# Patient Record
Sex: FEMALE | Race: WHITE | NOT HISPANIC OR LATINO | Employment: OTHER | URBAN - METROPOLITAN AREA
[De-identification: names, ages, dates, MRNs, and addresses within clinical notes are randomized per-mention and may not be internally consistent; named-entity substitution may affect disease eponyms.]

---

## 2023-02-21 LAB
BASOPHILS (10*3/UL) IN BLOOD BY AUTOMATED COUNT: 0.06 X10E9/L (ref 0–0.1)
BASOPHILS/100 LEUKOCYTES IN BLOOD BY AUTOMATED COUNT: 1 % (ref 0–2)
EOSINOPHILS (10*3/UL) IN BLOOD BY AUTOMATED COUNT: 0.25 X10E9/L (ref 0–0.7)
EOSINOPHILS/100 LEUKOCYTES IN BLOOD BY AUTOMATED COUNT: 4.1 % (ref 0–6)
ERYTHROCYTE DISTRIBUTION WIDTH (RATIO) BY AUTOMATED COUNT: 13.3 % (ref 11.5–14.5)
ERYTHROCYTE MEAN CORPUSCULAR HEMOGLOBIN CONCENTRATION (G/DL) BY AUTOMATED: 35.9 G/DL (ref 32–36)
ERYTHROCYTE MEAN CORPUSCULAR VOLUME (FL) BY AUTOMATED COUNT: 100 FL (ref 80–100)
ERYTHROCYTES (10*6/UL) IN BLOOD BY AUTOMATED COUNT: 3.9 X10E12/L (ref 4–5.2)
HEMATOCRIT (%) IN BLOOD BY AUTOMATED COUNT: 39 % (ref 36–46)
HEMOGLOBIN (G/DL) IN BLOOD: 14 G/DL (ref 12–16)
IMMATURE GRANULOCYTES/100 LEUKOCYTES IN BLOOD BY AUTOMATED COUNT: 0.7 % (ref 0–0.9)
INR IN PPP BY COAGULATION ASSAY: 1 (ref 0.9–1.1)
LEUKOCYTES (10*3/UL) IN BLOOD BY AUTOMATED COUNT: 6.1 X10E9/L (ref 4.4–11.3)
LYMPHOCYTES (10*3/UL) IN BLOOD BY AUTOMATED COUNT: 1.58 X10E9/L (ref 1.2–4.8)
LYMPHOCYTES/100 LEUKOCYTES IN BLOOD BY AUTOMATED COUNT: 26 % (ref 13–44)
MONOCYTES (10*3/UL) IN BLOOD BY AUTOMATED COUNT: 0.4 X10E9/L (ref 0.1–1)
MONOCYTES/100 LEUKOCYTES IN BLOOD BY AUTOMATED COUNT: 6.6 % (ref 2–10)
NEUTROPHILS (10*3/UL) IN BLOOD BY AUTOMATED COUNT: 3.75 X10E9/L (ref 1.2–7.7)
NEUTROPHILS/100 LEUKOCYTES IN BLOOD BY AUTOMATED COUNT: 61.6 % (ref 40–80)
PLATELETS (10*3/UL) IN BLOOD AUTOMATED COUNT: 214 X10E9/L (ref 150–450)
PROTHROMBIN TIME (PT) IN PPP BY COAGULATION ASSAY: 11 SEC (ref 9.8–13.4)

## 2023-02-22 LAB
ALANINE AMINOTRANSFERASE (SGPT) (U/L) IN SER/PLAS: 69 U/L (ref 7–45)
ALBUMIN (G/DL) IN SER/PLAS: 4.4 G/DL (ref 3.4–5)
ALKALINE PHOSPHATASE (U/L) IN SER/PLAS: 112 U/L (ref 33–136)
ALPHA-1 FETOPROTEIN (NG/ML) IN SER/PLAS: 11 NG/ML (ref 0–9)
ANION GAP IN SER/PLAS: 13 MMOL/L (ref 10–20)
ASPARTATE AMINOTRANSFERASE (SGOT) (U/L) IN SER/PLAS: 47 U/L (ref 9–39)
BILIRUBIN DIRECT (MG/DL) IN SER/PLAS: 0.3 MG/DL (ref 0–0.3)
BILIRUBIN TOTAL (MG/DL) IN SER/PLAS: 1 MG/DL (ref 0–1.2)
CALCIUM (MG/DL) IN SER/PLAS: 9.4 MG/DL (ref 8.6–10.6)
CARBON DIOXIDE, TOTAL (MMOL/L) IN SER/PLAS: 28 MMOL/L (ref 21–32)
CHLORIDE (MMOL/L) IN SER/PLAS: 99 MMOL/L (ref 98–107)
CREATININE (MG/DL) IN SER/PLAS: 0.99 MG/DL (ref 0.5–1.05)
GFR FEMALE: 63 ML/MIN/1.73M2
GLUCOSE (MG/DL) IN SER/PLAS: 97 MG/DL (ref 74–99)
POTASSIUM (MMOL/L) IN SER/PLAS: 3.8 MMOL/L (ref 3.5–5.3)
PROTEIN TOTAL: 7.1 G/DL (ref 6.4–8.2)
SODIUM (MMOL/L) IN SER/PLAS: 136 MMOL/L (ref 136–145)
UREA NITROGEN (MG/DL) IN SER/PLAS: 14 MG/DL (ref 6–23)

## 2023-03-05 DIAGNOSIS — I10 PRIMARY HYPERTENSION: Primary | ICD-10-CM

## 2023-03-06 RX ORDER — AMLODIPINE BESYLATE 10 MG/1
TABLET ORAL
Qty: 90 TABLET | Refills: 1 | Status: SHIPPED | OUTPATIENT
Start: 2023-03-06 | End: 2023-11-08

## 2023-04-19 DIAGNOSIS — F41.9 ANXIETY: ICD-10-CM

## 2023-04-19 RX ORDER — NITROGLYCERIN 0.4 MG/1
TABLET SUBLINGUAL
COMMUNITY
Start: 2022-02-01 | End: 2023-12-13 | Stop reason: SDUPTHER

## 2023-04-19 RX ORDER — URSODIOL 500 MG/1
1 TABLET, FILM COATED ORAL 2 TIMES DAILY
COMMUNITY
Start: 2021-08-19 | End: 2023-12-13 | Stop reason: SDUPTHER

## 2023-04-19 RX ORDER — ALBUTEROL SULFATE 90 UG/1
2 AEROSOL, METERED RESPIRATORY (INHALATION) EVERY 4 HOURS PRN
COMMUNITY
Start: 2022-05-18 | End: 2023-12-13

## 2023-04-19 RX ORDER — ALPRAZOLAM 0.5 MG/1
TABLET ORAL
COMMUNITY
Start: 2020-06-01 | End: 2023-04-21 | Stop reason: SDUPTHER

## 2023-04-19 RX ORDER — DOXAZOSIN 4 MG/1
1 TABLET ORAL DAILY
COMMUNITY
Start: 2020-08-13 | End: 2023-06-02 | Stop reason: SDUPTHER

## 2023-04-19 RX ORDER — FLUOXETINE HYDROCHLORIDE 40 MG/1
1 CAPSULE ORAL DAILY
COMMUNITY
Start: 2020-06-01 | End: 2023-04-19 | Stop reason: SDUPTHER

## 2023-04-19 RX ORDER — CLOPIDOGREL BISULFATE 75 MG/1
1 TABLET ORAL DAILY
COMMUNITY
Start: 2020-06-01 | End: 2023-12-13 | Stop reason: SDUPTHER

## 2023-04-19 RX ORDER — METOPROLOL TARTRATE 50 MG/1
1 TABLET ORAL EVERY 12 HOURS
COMMUNITY
Start: 2020-06-01 | End: 2023-12-13 | Stop reason: SDUPTHER

## 2023-04-19 RX ORDER — PANTOPRAZOLE SODIUM 40 MG/1
TABLET, DELAYED RELEASE ORAL
COMMUNITY
Start: 2021-08-30 | End: 2023-12-13 | Stop reason: SDUPTHER

## 2023-04-19 RX ORDER — LISINOPRIL 40 MG/1
1 TABLET ORAL DAILY
COMMUNITY
Start: 2020-06-01 | End: 2023-12-13 | Stop reason: SDUPTHER

## 2023-04-21 ENCOUNTER — OFFICE VISIT (OUTPATIENT)
Dept: PRIMARY CARE | Facility: CLINIC | Age: 68
End: 2023-04-21
Payer: MEDICARE

## 2023-04-21 ENCOUNTER — LAB (OUTPATIENT)
Dept: LAB | Facility: LAB | Age: 68
End: 2023-04-21
Payer: MEDICARE

## 2023-04-21 VITALS
DIASTOLIC BLOOD PRESSURE: 84 MMHG | SYSTOLIC BLOOD PRESSURE: 134 MMHG | TEMPERATURE: 96.6 F | WEIGHT: 178 LBS | OXYGEN SATURATION: 97 % | HEART RATE: 65 BPM | HEIGHT: 61 IN | BODY MASS INDEX: 33.61 KG/M2

## 2023-04-21 DIAGNOSIS — I10 PRIMARY HYPERTENSION: ICD-10-CM

## 2023-04-21 DIAGNOSIS — F41.1 GENERALIZED ANXIETY DISORDER: ICD-10-CM

## 2023-04-21 DIAGNOSIS — F17.210 CIGARETTE NICOTINE DEPENDENCE WITHOUT COMPLICATION: ICD-10-CM

## 2023-04-21 DIAGNOSIS — R74.8 ELEVATED ALKALINE PHOSPHATASE LEVEL: ICD-10-CM

## 2023-04-21 DIAGNOSIS — F41.1 GENERALIZED ANXIETY DISORDER: Primary | ICD-10-CM

## 2023-04-21 DIAGNOSIS — Z51.81 ENCOUNTER FOR THERAPEUTIC DRUG LEVEL MONITORING: ICD-10-CM

## 2023-04-21 DIAGNOSIS — K74.3 PRIMARY BILIARY CHOLANGITIS (MULTI): ICD-10-CM

## 2023-04-21 PROBLEM — F41.8 DEPRESSION WITH ANXIETY: Status: ACTIVE | Noted: 2019-10-17

## 2023-04-21 PROBLEM — F41.9 ANXIETY DISORDER: Status: ACTIVE | Noted: 2023-04-21

## 2023-04-21 PROBLEM — M67.912 TENDINOPATHY OF BOTH SHOULDERS: Status: RESOLVED | Noted: 2023-04-21 | Resolved: 2023-04-21

## 2023-04-21 PROBLEM — R06.02 SHORTNESS OF BREATH ON EXERTION: Status: RESOLVED | Noted: 2023-04-21 | Resolved: 2023-04-21

## 2023-04-21 PROBLEM — K74.60 CIRRHOSIS (MULTI): Status: ACTIVE | Noted: 2023-04-21

## 2023-04-21 PROBLEM — E66.9 OBESITY, CLASS I, BMI 30-34.9: Status: ACTIVE | Noted: 2019-10-17

## 2023-04-21 PROBLEM — E66.3 OVERWEIGHT: Status: RESOLVED | Noted: 2023-04-21 | Resolved: 2023-04-21

## 2023-04-21 PROBLEM — E66.811 OBESITY, CLASS I, BMI 30-34.9: Status: ACTIVE | Noted: 2019-10-17

## 2023-04-21 PROBLEM — M79.602 PAIN IN BOTH UPPER EXTREMITIES: Status: RESOLVED | Noted: 2023-04-21 | Resolved: 2023-04-21

## 2023-04-21 PROBLEM — M79.601 PAIN IN BOTH UPPER EXTREMITIES: Status: RESOLVED | Noted: 2023-04-21 | Resolved: 2023-04-21

## 2023-04-21 PROBLEM — I25.10 CORONARY ARTERY DISEASE: Status: ACTIVE | Noted: 2023-04-21

## 2023-04-21 PROBLEM — M67.911 TENDINOPATHY OF BOTH SHOULDERS: Status: RESOLVED | Noted: 2023-04-21 | Resolved: 2023-04-21

## 2023-04-21 PROBLEM — K52.9 CHRONIC DIARRHEA: Status: ACTIVE | Noted: 2023-04-21

## 2023-04-21 PROBLEM — E78.5 HYPERLIPIDEMIA: Status: ACTIVE | Noted: 2023-04-21

## 2023-04-21 PROBLEM — M25.511 BILATERAL SHOULDER PAIN: Status: RESOLVED | Noted: 2023-04-21 | Resolved: 2023-04-21

## 2023-04-21 PROBLEM — K75.89 CHOLESTATIC HEPATITIS: Status: RESOLVED | Noted: 2023-04-21 | Resolved: 2023-04-21

## 2023-04-21 PROBLEM — M25.512 BILATERAL SHOULDER PAIN: Status: RESOLVED | Noted: 2023-04-21 | Resolved: 2023-04-21

## 2023-04-21 PROBLEM — R92.8 ABNORMAL MAMMOGRAM OF LEFT BREAST: Status: RESOLVED | Noted: 2023-04-21 | Resolved: 2023-04-21

## 2023-04-21 PROBLEM — K21.9 GERD (GASTROESOPHAGEAL REFLUX DISEASE): Status: ACTIVE | Noted: 2023-04-21

## 2023-04-21 PROBLEM — R79.89 ELEVATED FERRITIN: Status: RESOLVED | Noted: 2023-04-21 | Resolved: 2023-04-21

## 2023-04-21 LAB
AMPHETAMINE (PRESENCE) IN URINE BY SCREEN METHOD: ABNORMAL
BARBITURATES PRESENCE IN URINE BY SCREEN METHOD: ABNORMAL
BENZODIAZEPINE (PRESENCE) IN URINE BY SCREEN METHOD: ABNORMAL
CANNABINOIDS IN URINE BY SCREEN METHOD: ABNORMAL
COCAINE (PRESENCE) IN URINE BY SCREEN METHOD: ABNORMAL
DRUG SCREEN COMMENT URINE: ABNORMAL
FENTANYL URINE: ABNORMAL
METHADONE (PRESENCE) IN URINE BY SCREEN METHOD: ABNORMAL
OPIATES (PRESENCE) IN URINE BY SCREEN METHOD: ABNORMAL
OXYCODONE (PRESENCE) IN URINE BY SCREEN METHOD: ABNORMAL
PHENCYCLIDINE (PRESENCE) IN URINE BY SCREEN METHOD: ABNORMAL

## 2023-04-21 PROCEDURE — 3075F SYST BP GE 130 - 139MM HG: CPT | Performed by: FAMILY MEDICINE

## 2023-04-21 PROCEDURE — 80346 BENZODIAZEPINES1-12: CPT

## 2023-04-21 PROCEDURE — 1160F RVW MEDS BY RX/DR IN RCRD: CPT | Performed by: FAMILY MEDICINE

## 2023-04-21 PROCEDURE — 99213 OFFICE O/P EST LOW 20 MIN: CPT | Performed by: FAMILY MEDICINE

## 2023-04-21 PROCEDURE — 1159F MED LIST DOCD IN RCRD: CPT | Performed by: FAMILY MEDICINE

## 2023-04-21 PROCEDURE — 3079F DIAST BP 80-89 MM HG: CPT | Performed by: FAMILY MEDICINE

## 2023-04-21 PROCEDURE — 80307 DRUG TEST PRSMV CHEM ANLYZR: CPT

## 2023-04-21 RX ORDER — ALPRAZOLAM 0.5 MG/1
0.5 TABLET ORAL 3 TIMES DAILY PRN
Qty: 40 TABLET | Refills: 5 | Status: SHIPPED | OUTPATIENT
Start: 2023-05-04 | End: 2023-10-09 | Stop reason: SDUPTHER

## 2023-04-21 RX ORDER — FLUOXETINE HYDROCHLORIDE 40 MG/1
40 CAPSULE ORAL DAILY
Qty: 90 CAPSULE | Refills: 3 | Status: SHIPPED | OUTPATIENT
Start: 2023-04-21 | End: 2023-12-13 | Stop reason: SDUPTHER

## 2023-04-21 ASSESSMENT — PAIN SCALES - GENERAL: PAINLEVEL: 0-NO PAIN

## 2023-04-21 NOTE — PROGRESS NOTES
Subjective   Patient ID: Yenny Harvey is a 68 y.o. female.    Patient is here For refill on alprazolam.  She has a very long history of anxiety.  She is on fluoxetine.  She has seen counselors.  She has a history of chronic liver disease and she drinks daily.  She is seeing a hepatologist for elevated liver functions.  Blood pressure is under fair control with medication.      OARRS:  Shelbi Donald MD on 4/21/2023  2:53 PM  I have personally reviewed the OARRS report for Yenny Harvey. I have considered the risks of abuse, dependence, addiction and diversion and I believe that it is clinically appropriate for Yenny Harvey to be prescribed this medication    Is the patient prescribed a combination of a benzodiazepine and opioid?  Yes, I feel it is clincially indicated to continue the medication and have discussed with the patient risks/benefits/alternatives.    Last Urine Drug Screen / ordered today: Yes  Recent Results (from the past 52401 hour(s))   Benzodiazepine Confirmation, Urine    Collection Time: 04/21/23  3:10 PM   Result Value Ref Range    7-Aminoclonazepam <25 Cutoff <25 ng/mL    Alpha-Hydroxyalprazolam 41 (A) Cutoff <25 ng/mL    Alpha-Hydroxymidazolam <25 Cutoff <25 ng/mL    Alprazolam 48 (A) Cutoff <25 ng/mL    Chlordiazepoxide <25 Cutoff <25 ng/mL    Clonazepam <25 Cutoff <25 ng/mL    Diazepam <25 Cutoff <25 ng/mL    Lorazepam <25 Cutoff <25 ng/mL    Midazolam <25 Cutoff <25 ng/mL    Nordiazepam <25 Cutoff <25 ng/mL    Oxazepam <25 Cutoff <25 ng/mL    Temazepam <25 Cutoff <25 ng/mL   Drug Screen, Urine With Reflex to Confirmation    Collection Time: 04/21/23  3:10 PM   Result Value Ref Range    DRUG SCREEN COMMENT URINE SEE BELOW     Amphetamine Screen, Urine PRESUMPTIVE NEGATIVE NEGATIVE    Barbiturate Screen, Urine PRESUMPTIVE NEGATIVE NEGATIVE    BENZODIAZEPINE (PRESENCE) IN URINE BY SCREEN METHOD PRESUMPTIVE POSITIVE (A) NEGATIVE    Cannabinoid Screen, Urine PRESUMPTIVE NEGATIVE NEGATIVE     Cocaine Screen, Urine PRESUMPTIVE NEGATIVE NEGATIVE    Fentanyl, Ur PRESUMPTIVE NEGATIVE NEGATIVE    Methadone Screen, Urine PRESUMPTIVE NEGATIVE NEGATIVE    Opiate Screen, Urine PRESUMPTIVE NEGATIVE NEGATIVE    Oxycodone Screen, Ur PRESUMPTIVE NEGATIVE NEGATIVE    PCP Screen, Urine PRESUMPTIVE NEGATIVE NEGATIVE   OPIATE/OPIOID/BENZO PRESCRIPTION COMPLIANCE    Collection Time: 05/18/22 11:58 AM   Result Value Ref Range    DRUG SCREEN COMMENT URINE SEE BELOW     Creatine, Urine 36.1 mg/dL    Amphetamine Screen, Urine PRESUMPTIVE NEGATIVE NEGATIVE    Barbiturate Screen, Urine PRESUMPTIVE NEGATIVE NEGATIVE    Cannabinoid Screen, Urine PRESUMPTIVE NEGATIVE NEGATIVE    Cocaine Screen, Urine PRESUMPTIVE NEGATIVE NEGATIVE    PCP Screen, Urine PRESUMPTIVE NEGATIVE NEGATIVE    7-Aminoclonazepam <25 Cutoff <25 ng/mL    Alpha-Hydroxyalprazolam 53 (A) Cutoff <25 ng/mL    Alpha-Hydroxymidazolam <25 Cutoff <25 ng/mL    Alprazolam 41 (A) Cutoff <25 ng/mL    Chlordiazepoxide <25 Cutoff <25 ng/mL    Clonazepam <25 Cutoff <25 ng/mL    Diazepam <25 Cutoff <25 ng/mL    Lorazepam <25 Cutoff <25 ng/mL    Midazolam <25 Cutoff <25 ng/mL    Nordiazepam <25 Cutoff <25 ng/mL    Oxazepam <25 Cutoff <25 ng/mL    Temazepam <25 Cutoff <25 ng/mL    Zolpidem <25 Cutoff <25 ng/mL    Zolpidem Metabolite (ZCA) <25 Cutoff <25 ng/mL    6-Acetylmorphine <25 Cutoff <25 ng/mL    Codeine <50 Cutoff <50 ng/mL    Hydrocodone <25 Cutoff <25 ng/mL    Hydromorphone <25 Cutoff <25 ng/mL    Morphine Urine <50 Cutoff <50 ng/mL    Norhydrocodone <25 Cutoff <25 ng/mL    Noroxycodone <25 Cutoff <25 ng/mL    Oxycodone <25 Cutoff <25 ng/mL    Oxymorphone <25 Cutoff <25 ng/mL    Tramadol <50 Cutoff <50 ng/mL    O-Desmethyltramadol <50 Cutoff <50 ng/mL    Fentanyl <2.5 Cutoff<2.5 ng/mL    Norfentanyl <2.5 Cutoff<2.5 ng/mL    METHADONE CONFIRMATION,URINE <25 Cutoff <25 ng/mL    EDDP <25 Cutoff <25 ng/mL     Results are as expected.   Ordered today    Controlled  Substance Agreement:  Date of the Last Agreement: today  Reviewed Controlled Substance Agreement including but not limited to the benefits, risks, and alternatives to treatment with a Controlled Substance medication(s).    Benzodiazepines:  What is the patient's goal of therapy? Less anxiety  Is this being achieved with current treatment? yes    PREMA-7:  Over the last 2 weeks, how often have you been bothered by any of the following problems?  Feeling nervous, anxious, or on edge: 3  Not being able to stop or control worryin  Worrying too much about different things: 1  Trouble relaxing: 3  Being so restless that it is hard to sit still: 2  Becoming easily annoyed or irritable: 2  Feeling afraid as if something awful might happen: 1  PREMA-7 Total Score: 13        Activities of Daily Living:   Is your overall impression that this patient is benefiting (symptom reduction outweighs side effects) from benzodiazepine therapy? Yes     1. Physical Functioning: Same  2. Family Relationship: Better  3. Social Relationship: Better  4. Mood: Better  5. Sleep Patterns: Better  6. Overall Function: Better  Review of Systems   Constitutional:  Negative for fatigue, fever and unexpected weight change.   HENT:  Negative for congestion, ear pain, hearing loss, sore throat and trouble swallowing.    Eyes:  Negative for pain and visual disturbance.   Respiratory:  Negative for cough and shortness of breath.    Cardiovascular:  Negative for chest pain, palpitations and leg swelling.   Gastrointestinal:  Negative for abdominal pain, blood in stool, diarrhea, nausea and vomiting.   Genitourinary:  Negative for dysuria, frequency, hematuria and urgency.   Musculoskeletal:  Negative for joint swelling.   Skin:  Negative for pallor and rash.   Neurological:  Negative for dizziness, syncope, weakness, numbness and headaches.   Psychiatric/Behavioral:  Negative for confusion, decreased concentration, hallucinations and suicidal ideas. The  patient is nervous/anxious.      Vitals:    04/21/23 1438   BP: 134/84   Pulse: 65   Temp: 35.9 °C (96.6 °F)   SpO2: 97%      Objective   Physical Exam  Constitutional:       Appearance: Normal appearance.   Cardiovascular:      Rate and Rhythm: Normal rate and regular rhythm.      Heart sounds: Normal heart sounds.   Pulmonary:      Effort: Pulmonary effort is normal.      Breath sounds: Normal breath sounds.   Musculoskeletal:      Cervical back: Neck supple.   Skin:     General: Skin is warm and dry.   Neurological:      General: No focal deficit present.      Mental Status: She is alert.   Psychiatric:         Mood and Affect: Mood normal.         Speech: Speech normal.         Behavior: Behavior normal.         Cognition and Memory: Cognition normal.         Assessment/Plan   Diagnoses and all orders for this visit:  Generalized anxiety disorder  -     Drug Screen, Urine With Reflex to Confirmation; Future  -     ALPRAZolam (Xanax) 0.5 mg tablet; Take 1 tablet (0.5 mg) by mouth 3 times a day as needed for anxiety. Do not start before May 4, 2023.  Encounter for therapeutic drug level monitoring  -     Drug Screen, Urine With Reflex to Confirmation; Future  Primary hypertension  Primary biliary cholangitis (CMS/HCC)  Cigarette nicotine dependence without complication  Elevated alkaline phosphatase level

## 2023-04-26 LAB
7-AMINOCLONAZEPAM: <25 NG/ML
ALPHA-HYDROXYALPRAZOLAM: 41 NG/ML
ALPHA-HYDROXYMIDAZOLAM: <25 NG/ML
ALPRAZOLAM: 48 NG/ML
CHLORDIAZEPOXIDE: <25 NG/ML
CLONAZEPAM: <25 NG/ML
DIAZEPAM: <25 NG/ML
LORAZEPAM: <25 NG/ML
MIDAZOLAM: <25 NG/ML
NORDIAZEPAM: <25 NG/ML
OXAZEPAM: <25 NG/ML
TEMAZEPAM: <25 NG/ML

## 2023-04-27 ENCOUNTER — TELEPHONE (OUTPATIENT)
Dept: PRIMARY CARE | Facility: CLINIC | Age: 68
End: 2023-04-27
Payer: MEDICARE

## 2023-04-27 DIAGNOSIS — E03.9 ACQUIRED HYPOTHYROIDISM: Primary | ICD-10-CM

## 2023-04-27 NOTE — TELEPHONE ENCOUNTER
Patient was here last week and stated that you were going to send a thyroid medication. Needs sent to [facility] pharmacy.

## 2023-04-28 RX ORDER — LEVOTHYROXINE SODIUM 25 UG/1
25 TABLET ORAL DAILY
Qty: 90 TABLET | Refills: 1 | Status: SHIPPED | OUTPATIENT
Start: 2023-04-28 | End: 2023-12-13

## 2023-05-10 PROBLEM — I10 HYPERTENSION: Status: RESOLVED | Noted: 2023-04-21 | Resolved: 2023-05-10

## 2023-05-10 ASSESSMENT — ANXIETY QUESTIONNAIRES
2. NOT BEING ABLE TO STOP OR CONTROL WORRYING: SEVERAL DAYS
5. BEING SO RESTLESS THAT IT IS HARD TO SIT STILL: MORE THAN HALF THE DAYS
6. BECOMING EASILY ANNOYED OR IRRITABLE: MORE THAN HALF THE DAYS
IF YOU CHECKED OFF ANY PROBLEMS ON THIS QUESTIONNAIRE, HOW DIFFICULT HAVE THESE PROBLEMS MADE IT FOR YOU TO DO YOUR WORK, TAKE CARE OF THINGS AT HOME, OR GET ALONG WITH OTHER PEOPLE: VERY DIFFICULT
3. WORRYING TOO MUCH ABOUT DIFFERENT THINGS: SEVERAL DAYS
4. TROUBLE RELAXING: NEARLY EVERY DAY
GAD7 TOTAL SCORE: 13
1. FEELING NERVOUS, ANXIOUS, OR ON EDGE: NEARLY EVERY DAY
7. FEELING AFRAID AS IF SOMETHING AWFUL MIGHT HAPPEN: SEVERAL DAYS

## 2023-05-10 ASSESSMENT — ENCOUNTER SYMPTOMS
EYE PAIN: 0
UNEXPECTED WEIGHT CHANGE: 0
TROUBLE SWALLOWING: 0
PALPITATIONS: 0
DIARRHEA: 0
NERVOUS/ANXIOUS: 1
DECREASED CONCENTRATION: 0
VOMITING: 0
DYSURIA: 0
BLOOD IN STOOL: 0
FREQUENCY: 0
SHORTNESS OF BREATH: 0
FEVER: 0
HEMATURIA: 0
FATIGUE: 0
SORE THROAT: 0
HEADACHES: 0
ABDOMINAL PAIN: 0
NAUSEA: 0
NUMBNESS: 0
JOINT SWELLING: 0
CONFUSION: 0
HALLUCINATIONS: 0
DIZZINESS: 0
COUGH: 0
WEAKNESS: 0

## 2023-05-10 NOTE — PATIENT INSTRUCTIONS
It was nice to see you today!  Discussed current concerns and addressed   Reviewed recent labs and diagnostics  Reviewed medications list  Continue to eat a healthy diet, exercise at least 3 times a week or more  Plan and follow up discussed  For any further information related to your condition, copy and paste or go to [email or web address]

## 2023-05-11 ENCOUNTER — TELEPHONE (OUTPATIENT)
Dept: PRIMARY CARE | Facility: CLINIC | Age: 68
End: 2023-05-11
Payer: MEDICARE

## 2023-05-12 DIAGNOSIS — E03.9 ACQUIRED HYPOTHYROIDISM: ICD-10-CM

## 2023-05-16 ENCOUNTER — APPOINTMENT (OUTPATIENT)
Dept: LAB | Facility: LAB | Age: 68
End: 2023-05-16
Payer: MEDICARE

## 2023-05-16 LAB
ALANINE AMINOTRANSFERASE (SGPT) (U/L) IN SER/PLAS: 109 U/L (ref 7–45)
ALBUMIN (G/DL) IN SER/PLAS: 4.3 G/DL (ref 3.4–5)
ALKALINE PHOSPHATASE (U/L) IN SER/PLAS: 126 U/L (ref 33–136)
ANION GAP IN SER/PLAS: 12 MMOL/L (ref 10–20)
ASPARTATE AMINOTRANSFERASE (SGOT) (U/L) IN SER/PLAS: 75 U/L (ref 9–39)
BASOPHILS (10*3/UL) IN BLOOD BY AUTOMATED COUNT: 0.08 X10E9/L (ref 0–0.1)
BASOPHILS/100 LEUKOCYTES IN BLOOD BY AUTOMATED COUNT: 1.4 % (ref 0–2)
BILIRUBIN DIRECT (MG/DL) IN SER/PLAS: 0.3 MG/DL (ref 0–0.3)
BILIRUBIN TOTAL (MG/DL) IN SER/PLAS: 1 MG/DL (ref 0–1.2)
CALCIUM (MG/DL) IN SER/PLAS: 9.7 MG/DL (ref 8.6–10.3)
CARBON DIOXIDE, TOTAL (MMOL/L) IN SER/PLAS: 29 MMOL/L (ref 21–32)
CHLORIDE (MMOL/L) IN SER/PLAS: 97 MMOL/L (ref 98–107)
CREATININE (MG/DL) IN SER/PLAS: 0.98 MG/DL (ref 0.5–1.05)
EOSINOPHILS (10*3/UL) IN BLOOD BY AUTOMATED COUNT: 0.23 X10E9/L (ref 0–0.7)
EOSINOPHILS/100 LEUKOCYTES IN BLOOD BY AUTOMATED COUNT: 3.9 % (ref 0–6)
ERYTHROCYTE DISTRIBUTION WIDTH (RATIO) BY AUTOMATED COUNT: 12.9 % (ref 11.5–14.5)
ERYTHROCYTE MEAN CORPUSCULAR HEMOGLOBIN CONCENTRATION (G/DL) BY AUTOMATED: 35.2 G/DL (ref 32–36)
ERYTHROCYTE MEAN CORPUSCULAR VOLUME (FL) BY AUTOMATED COUNT: 105 FL (ref 80–100)
ERYTHROCYTES (10*6/UL) IN BLOOD BY AUTOMATED COUNT: 4.09 X10E12/L (ref 4–5.2)
GFR FEMALE: 63 ML/MIN/1.73M2
GLUCOSE (MG/DL) IN SER/PLAS: 96 MG/DL (ref 74–99)
HEMATOCRIT (%) IN BLOOD BY AUTOMATED COUNT: 42.9 % (ref 36–46)
HEMOGLOBIN (G/DL) IN BLOOD: 15.1 G/DL (ref 12–16)
IMMATURE GRANULOCYTES/100 LEUKOCYTES IN BLOOD BY AUTOMATED COUNT: 0.5 % (ref 0–0.9)
LEUKOCYTES (10*3/UL) IN BLOOD BY AUTOMATED COUNT: 5.9 X10E9/L (ref 4.4–11.3)
LYMPHOCYTES (10*3/UL) IN BLOOD BY AUTOMATED COUNT: 1.7 X10E9/L (ref 1.2–4.8)
LYMPHOCYTES/100 LEUKOCYTES IN BLOOD BY AUTOMATED COUNT: 28.9 % (ref 13–44)
MONOCYTES (10*3/UL) IN BLOOD BY AUTOMATED COUNT: 0.33 X10E9/L (ref 0.1–1)
MONOCYTES/100 LEUKOCYTES IN BLOOD BY AUTOMATED COUNT: 5.6 % (ref 2–10)
NEUTROPHILS (10*3/UL) IN BLOOD BY AUTOMATED COUNT: 3.52 X10E9/L (ref 1.2–7.7)
NEUTROPHILS/100 LEUKOCYTES IN BLOOD BY AUTOMATED COUNT: 59.7 % (ref 40–80)
PLATELETS (10*3/UL) IN BLOOD AUTOMATED COUNT: 152 X10E9/L (ref 150–450)
POTASSIUM (MMOL/L) IN SER/PLAS: 4.1 MMOL/L (ref 3.5–5.3)
PROTEIN TOTAL: 7.3 G/DL (ref 6.4–8.2)
SODIUM (MMOL/L) IN SER/PLAS: 134 MMOL/L (ref 136–145)
UREA NITROGEN (MG/DL) IN SER/PLAS: 9 MG/DL (ref 6–23)

## 2023-05-17 LAB
ALPHA-1 FETOPROTEIN (NG/ML) IN SER/PLAS: 10 NG/ML (ref 0–9)
INR IN PPP BY COAGULATION ASSAY: 1 (ref 0.9–1.1)
PROTHROMBIN TIME (PT) IN PPP BY COAGULATION ASSAY: 11.4 SEC (ref 9.8–13.4)

## 2023-06-02 DIAGNOSIS — I10 PRIMARY HYPERTENSION: Primary | ICD-10-CM

## 2023-06-02 RX ORDER — DOXAZOSIN 4 MG/1
4 TABLET ORAL DAILY
Qty: 90 TABLET | Refills: 3 | Status: SHIPPED | OUTPATIENT
Start: 2023-06-02 | End: 2023-12-13 | Stop reason: SDUPTHER

## 2023-07-10 ENCOUNTER — APPOINTMENT (OUTPATIENT)
Dept: PRIMARY CARE | Facility: CLINIC | Age: 68
End: 2023-07-10
Payer: MEDICARE

## 2023-08-30 ENCOUNTER — LAB (OUTPATIENT)
Dept: LAB | Facility: LAB | Age: 68
End: 2023-08-30
Payer: MEDICARE

## 2023-08-30 DIAGNOSIS — E03.9 ACQUIRED HYPOTHYROIDISM: ICD-10-CM

## 2023-08-30 LAB
ALANINE AMINOTRANSFERASE (SGPT) (U/L) IN SER/PLAS: 68 U/L (ref 7–45)
ALBUMIN (G/DL) IN SER/PLAS: 4.3 G/DL (ref 3.4–5)
ALKALINE PHOSPHATASE (U/L) IN SER/PLAS: 138 U/L (ref 33–136)
ANION GAP IN SER/PLAS: 12 MMOL/L (ref 10–20)
ASPARTATE AMINOTRANSFERASE (SGOT) (U/L) IN SER/PLAS: 55 U/L (ref 9–39)
BASOPHILS (10*3/UL) IN BLOOD BY AUTOMATED COUNT: NORMAL
BASOPHILS/100 LEUKOCYTES IN BLOOD BY AUTOMATED COUNT: NORMAL
BILIRUBIN DIRECT (MG/DL) IN SER/PLAS: 0.3 MG/DL (ref 0–0.3)
BILIRUBIN TOTAL (MG/DL) IN SER/PLAS: 0.9 MG/DL (ref 0–1.2)
CALCIUM (MG/DL) IN SER/PLAS: 9.7 MG/DL (ref 8.6–10.3)
CARBON DIOXIDE, TOTAL (MMOL/L) IN SER/PLAS: 29 MMOL/L (ref 21–32)
CHLORIDE (MMOL/L) IN SER/PLAS: 102 MMOL/L (ref 98–107)
CREATININE (MG/DL) IN SER/PLAS: 0.91 MG/DL (ref 0.5–1.05)
EOSINOPHILS (10*3/UL) IN BLOOD BY AUTOMATED COUNT: NORMAL
EOSINOPHILS/100 LEUKOCYTES IN BLOOD BY AUTOMATED COUNT: NORMAL
ERYTHROCYTE DISTRIBUTION WIDTH (RATIO) BY AUTOMATED COUNT: NORMAL
ERYTHROCYTE MEAN CORPUSCULAR HEMOGLOBIN CONCENTRATION (G/DL) BY AUTOMATED: NORMAL
ERYTHROCYTE MEAN CORPUSCULAR VOLUME (FL) BY AUTOMATED COUNT: NORMAL
ERYTHROCYTES (10*6/UL) IN BLOOD BY AUTOMATED COUNT: NORMAL
GFR FEMALE: 69 ML/MIN/1.73M2
GLUCOSE (MG/DL) IN SER/PLAS: 96 MG/DL (ref 74–99)
HEMATOCRIT (%) IN BLOOD BY AUTOMATED COUNT: NORMAL
HEMOGLOBIN (G/DL) IN BLOOD: NORMAL
IMMATURE GRANULOCYTES/100 LEUKOCYTES IN BLOOD BY AUTOMATED COUNT: NORMAL
INR IN PPP BY COAGULATION ASSAY: 1 (ref 0.9–1.1)
LEUKOCYTES (10*3/UL) IN BLOOD BY AUTOMATED COUNT: NORMAL
LYMPHOCYTES (10*3/UL) IN BLOOD BY AUTOMATED COUNT: NORMAL
LYMPHOCYTES/100 LEUKOCYTES IN BLOOD BY AUTOMATED COUNT: NORMAL
MANUAL DIFFERENTIAL Y/N: NORMAL
MONOCYTES (10*3/UL) IN BLOOD BY AUTOMATED COUNT: NORMAL
MONOCYTES/100 LEUKOCYTES IN BLOOD BY AUTOMATED COUNT: NORMAL
NEUTROPHILS (10*3/UL) IN BLOOD BY AUTOMATED COUNT: NORMAL
NEUTROPHILS/100 LEUKOCYTES IN BLOOD BY AUTOMATED COUNT: NORMAL
NRBC (PER 100 WBCS) BY AUTOMATED COUNT: NORMAL
PLATELETS (10*3/UL) IN BLOOD AUTOMATED COUNT: NORMAL
POTASSIUM (MMOL/L) IN SER/PLAS: 4.3 MMOL/L (ref 3.5–5.3)
PROTEIN TOTAL: 6.7 G/DL (ref 6.4–8.2)
PROTHROMBIN TIME (PT) IN PPP BY COAGULATION ASSAY: 11 SEC (ref 9.8–12.8)
SODIUM (MMOL/L) IN SER/PLAS: 139 MMOL/L (ref 136–145)
THYROTROPIN (MIU/L) IN SER/PLAS BY DETECTION LIMIT <= 0.05 MIU/L: 3.14 MIU/L (ref 0.44–3.98)
UREA NITROGEN (MG/DL) IN SER/PLAS: 9 MG/DL (ref 6–23)

## 2023-08-30 PROCEDURE — 36415 COLL VENOUS BLD VENIPUNCTURE: CPT

## 2023-08-30 PROCEDURE — 84443 ASSAY THYROID STIM HORMONE: CPT

## 2023-10-02 ENCOUNTER — APPOINTMENT (OUTPATIENT)
Dept: PRIMARY CARE | Facility: CLINIC | Age: 68
End: 2023-10-02
Payer: MEDICARE

## 2023-10-04 ENCOUNTER — OFFICE VISIT (OUTPATIENT)
Dept: PRIMARY CARE | Facility: CLINIC | Age: 68
End: 2023-10-04
Payer: MEDICARE

## 2023-10-04 VITALS
SYSTOLIC BLOOD PRESSURE: 100 MMHG | WEIGHT: 171 LBS | DIASTOLIC BLOOD PRESSURE: 60 MMHG | BODY MASS INDEX: 33.57 KG/M2 | TEMPERATURE: 97.4 F | HEART RATE: 64 BPM | OXYGEN SATURATION: 94 % | HEIGHT: 60 IN

## 2023-10-04 DIAGNOSIS — Z12.31 ENCOUNTER FOR SCREENING MAMMOGRAM FOR MALIGNANT NEOPLASM OF BREAST: ICD-10-CM

## 2023-10-04 DIAGNOSIS — Z12.11 ENCOUNTER FOR SCREENING FOR MALIGNANT NEOPLASM OF COLON: Primary | ICD-10-CM

## 2023-10-04 PROCEDURE — 3074F SYST BP LT 130 MM HG: CPT | Performed by: FAMILY MEDICINE

## 2023-10-04 PROCEDURE — 1160F RVW MEDS BY RX/DR IN RCRD: CPT | Performed by: FAMILY MEDICINE

## 2023-10-04 PROCEDURE — 1126F AMNT PAIN NOTED NONE PRSNT: CPT | Performed by: FAMILY MEDICINE

## 2023-10-04 PROCEDURE — 99213 OFFICE O/P EST LOW 20 MIN: CPT | Performed by: FAMILY MEDICINE

## 2023-10-04 PROCEDURE — 3078F DIAST BP <80 MM HG: CPT | Performed by: FAMILY MEDICINE

## 2023-10-04 PROCEDURE — 1159F MED LIST DOCD IN RCRD: CPT | Performed by: FAMILY MEDICINE

## 2023-10-04 ASSESSMENT — ANXIETY QUESTIONNAIRES
1. FEELING NERVOUS, ANXIOUS, OR ON EDGE: MORE THAN HALF THE DAYS
4. TROUBLE RELAXING: SEVERAL DAYS
GAD7 TOTAL SCORE: 8
5. BEING SO RESTLESS THAT IT IS HARD TO SIT STILL: SEVERAL DAYS
IF YOU CHECKED OFF ANY PROBLEMS ON THIS QUESTIONNAIRE, HOW DIFFICULT HAVE THESE PROBLEMS MADE IT FOR YOU TO DO YOUR WORK, TAKE CARE OF THINGS AT HOME, OR GET ALONG WITH OTHER PEOPLE: SOMEWHAT DIFFICULT
2. NOT BEING ABLE TO STOP OR CONTROL WORRYING: SEVERAL DAYS
7. FEELING AFRAID AS IF SOMETHING AWFUL MIGHT HAPPEN: SEVERAL DAYS
6. BECOMING EASILY ANNOYED OR IRRITABLE: SEVERAL DAYS
3. WORRYING TOO MUCH ABOUT DIFFERENT THINGS: SEVERAL DAYS

## 2023-10-04 ASSESSMENT — ENCOUNTER SYMPTOMS
TROUBLE SWALLOWING: 0
FATIGUE: 0
DECREASED CONCENTRATION: 0
SORE THROAT: 0
ARTHRALGIAS: 1
DIARRHEA: 0
UNEXPECTED WEIGHT CHANGE: 0
CONFUSION: 0
DIZZINESS: 0
NAUSEA: 0
EYE PAIN: 0
WEAKNESS: 0
SHORTNESS OF BREATH: 0
COUGH: 0
NUMBNESS: 0
FREQUENCY: 0
FEVER: 0
JOINT SWELLING: 0
ABDOMINAL PAIN: 0
BACK PAIN: 1
HEADACHES: 0
HALLUCINATIONS: 0
HEMATURIA: 0
PALPITATIONS: 0
VOMITING: 0
BLOOD IN STOOL: 0
DYSURIA: 0

## 2023-10-04 ASSESSMENT — PAIN SCALES - GENERAL: PAINLEVEL: 0-NO PAIN

## 2023-10-04 NOTE — PROGRESS NOTES
Subjective   Patient ID: Yenny Harvey is a 68 y.o. female.    Patient comes in for for Follow-up.  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  She is a smoker.  She sees a hepatologist.  She is on ursodiol and thankfully her AST and ALT are lower.  We reviewed the labs in detail that she had done the end of August.  She is due for Cologuard.  She declines any vaccines today.  She is drinking beer.  She is aware of its effects on the liver.  BMI 33.40.  Also carries a diagnosis of primary biliary cholangitis.  She does not report any jaundice. She takes prn alprazolam for anxiety. UDS/CSA current.    OARRS:  No data recorded  I have personally reviewed the OARRS report for Yenny Harvey. I have considered the risks of abuse, dependence, addiction and diversion    Is the patient prescribed a combination of a benzodiazepine and opioid?  No    Last Urine Drug Screen / ordered today: Yes  Recent Results (from the past 8760 hour(s))   Benzodiazepine Confirmation, Urine    Collection Time: 04/21/23  3:10 PM   Result Value Ref Range    7-Aminoclonazepam <25 Cutoff <25 ng/mL    Alpha-Hydroxyalprazolam 41 (A) Cutoff <25 ng/mL    Alpha-Hydroxymidazolam <25 Cutoff <25 ng/mL    Alprazolam 48 (A) Cutoff <25 ng/mL    Chlordiazepoxide <25 Cutoff <25 ng/mL    Clonazepam <25 Cutoff <25 ng/mL    Diazepam <25 Cutoff <25 ng/mL    Lorazepam <25 Cutoff <25 ng/mL    Midazolam <25 Cutoff <25 ng/mL    Nordiazepam <25 Cutoff <25 ng/mL    Oxazepam <25 Cutoff <25 ng/mL    Temazepam <25 Cutoff <25 ng/mL   Drug Screen, Urine With Reflex to Confirmation    Collection Time: 04/21/23  3:10 PM   Result Value Ref Range    DRUG SCREEN COMMENT URINE SEE BELOW     Amphetamine Screen, Urine PRESUMPTIVE NEGATIVE NEGATIVE    Barbiturate Screen, Urine PRESUMPTIVE NEGATIVE NEGATIVE    BENZODIAZEPINE (PRESENCE) IN URINE BY SCREEN METHOD PRESUMPTIVE POSITIVE (A) NEGATIVE    Cannabinoid Screen, Urine PRESUMPTIVE NEGATIVE NEGATIVE     Cocaine Screen, Urine PRESUMPTIVE NEGATIVE NEGATIVE    Fentanyl, Ur PRESUMPTIVE NEGATIVE NEGATIVE    Methadone Screen, Urine PRESUMPTIVE NEGATIVE NEGATIVE    Opiate Screen, Urine PRESUMPTIVE NEGATIVE NEGATIVE    Oxycodone Screen, Ur PRESUMPTIVE NEGATIVE NEGATIVE    PCP Screen, Urine PRESUMPTIVE NEGATIVE NEGATIVE     Results are as expected.         Controlled Substance Agreement:  Date of the Last Agreement: 4/23  Reviewed Controlled Substance Agreement including but not limited to the benefits, risks, and alternatives to treatment with a Controlled Substance medication(s).    Benzodiazepines:  What is the patient's goal of therapy? Less anxiety  Is this being achieved with current treatment? yes    PREMA-7:  No data recorded    Activities of Daily Living:   Is your overall impression that this patient is benefiting (symptom reduction outweighs side effects) from benzodiazepine therapy? Yes     1. Physical Functioning: Same  2. Family Relationship: Better  3. Social Relationship: Better  4. Mood: Better  5. Sleep Patterns: Better  6. Overall Function: Better    Review of Systems   Constitutional:  Negative for fatigue, fever and unexpected weight change.   HENT:  Negative for congestion, ear pain, hearing loss, sore throat and trouble swallowing.    Eyes:  Negative for pain and visual disturbance.   Respiratory:  Negative for cough and shortness of breath.    Cardiovascular:  Negative for chest pain, palpitations and leg swelling.   Gastrointestinal:  Negative for abdominal pain, blood in stool, diarrhea, nausea and vomiting.   Genitourinary:  Negative for dysuria, frequency, hematuria and urgency.   Musculoskeletal:  Positive for arthralgias and back pain. Negative for joint swelling.   Skin:  Negative for pallor and rash.   Neurological:  Negative for dizziness, syncope, weakness, numbness and headaches.   Psychiatric/Behavioral:  Negative for confusion, decreased concentration, hallucinations and suicidal ideas.       Vitals:    10/04/23 1123   BP: 100/60   Pulse: 64   Temp: 36.3 °C (97.4 °F)   SpO2: 94%      Objective   Physical Exam  Constitutional:       Appearance: Normal appearance.   HENT:      Head: Normocephalic and atraumatic.      Nose: Nose normal.      Mouth/Throat:      Mouth: Mucous membranes are moist.      Pharynx: Oropharynx is clear. No oropharyngeal exudate.   Eyes:      Extraocular Movements: Extraocular movements intact.      Conjunctiva/sclera: Conjunctivae normal.      Pupils: Pupils are equal, round, and reactive to light.   Cardiovascular:      Rate and Rhythm: Normal rate and regular rhythm.      Heart sounds: Normal heart sounds.   Pulmonary:      Effort: Pulmonary effort is normal.      Breath sounds: Normal breath sounds.   Abdominal:      General: Abdomen is flat.      Palpations: Abdomen is soft. There is no mass.      Tenderness: There is no abdominal tenderness. There is no guarding.   Musculoskeletal:      Cervical back: Neck supple.   Lymphadenopathy:      Cervical: No cervical adenopathy.   Skin:     General: Skin is warm and dry.      Coloration: Skin is not jaundiced.   Neurological:      General: No focal deficit present.      Mental Status: She is alert.   Psychiatric:         Mood and Affect: Mood normal.         Speech: Speech normal.         Behavior: Behavior normal.         Cognition and Memory: Cognition normal.         Assessment/Plan   There are no diagnoses linked to this encounter.

## 2023-10-04 NOTE — PATIENT INSTRUCTIONS
It was nice to see you today!  Discussed current concerns and addressed   Reviewed recent labs and diagnostics  Reviewed medications list  Continue to eat a healthy diet, exercise at least 3 times a week or more  Plan and follow up discussed  For any further information related to your condition, copy and paste or go to [email or web address]  Discussed use of alcohol. She is aware of effects on liver  See me in spring

## 2023-10-06 DIAGNOSIS — F41.1 GENERALIZED ANXIETY DISORDER: ICD-10-CM

## 2023-10-06 RX ORDER — ALPRAZOLAM 0.5 MG/1
0.5 TABLET ORAL 3 TIMES DAILY PRN
Qty: 40 TABLET | Refills: 5 | OUTPATIENT
Start: 2023-10-06

## 2023-10-06 NOTE — TELEPHONE ENCOUNTER
Patient seen Wednesday for medication refills. Needing her alprazolam sent to mail order. States she is getting low on medication.

## 2023-10-09 DIAGNOSIS — F41.1 GENERALIZED ANXIETY DISORDER: ICD-10-CM

## 2023-10-09 RX ORDER — ALPRAZOLAM 0.5 MG/1
0.5 TABLET ORAL 3 TIMES DAILY PRN
Qty: 40 TABLET | Refills: 5 | Status: SHIPPED | OUTPATIENT
Start: 2023-10-09 | End: 2023-12-28 | Stop reason: SDUPTHER

## 2023-10-09 NOTE — TELEPHONE ENCOUNTER
Yenny Harvey,  1955, called requesting that her prescriptions be sent to the [facility]. No more details were left on the machine. She can be reached at [phone].

## 2023-10-23 ENCOUNTER — ANCILLARY PROCEDURE (OUTPATIENT)
Dept: RADIOLOGY | Facility: HOSPITAL | Age: 68
End: 2023-10-23
Payer: MEDICARE

## 2023-10-23 DIAGNOSIS — K74.3 PRIMARY BILIARY CIRRHOSIS (MULTI): ICD-10-CM

## 2023-10-23 DIAGNOSIS — R74.8 ABNORMAL LEVELS OF OTHER SERUM ENZYMES: ICD-10-CM

## 2023-10-23 DIAGNOSIS — R93.2 ABNORMAL FINDINGS ON DIAGNOSTIC IMAGING OF LIVER AND BILIARY TRACT: ICD-10-CM

## 2023-10-23 DIAGNOSIS — K76.0 FATTY (CHANGE OF) LIVER, NOT ELSEWHERE CLASSIFIED: ICD-10-CM

## 2023-10-23 PROCEDURE — 76981 USE PARENCHYMA: CPT

## 2023-10-23 PROCEDURE — 76981 USE PARENCHYMA: CPT | Performed by: RADIOLOGY

## 2023-11-08 DIAGNOSIS — I10 PRIMARY HYPERTENSION: ICD-10-CM

## 2023-11-08 RX ORDER — AMLODIPINE BESYLATE 10 MG/1
TABLET ORAL
Qty: 90 TABLET | Refills: 3 | Status: SHIPPED | OUTPATIENT
Start: 2023-11-08 | End: 2023-12-13 | Stop reason: SDUPTHER

## 2023-12-13 ENCOUNTER — LAB (OUTPATIENT)
Dept: LAB | Facility: LAB | Age: 68
End: 2023-12-13
Payer: MEDICARE

## 2023-12-13 DIAGNOSIS — I10 PRIMARY HYPERTENSION: ICD-10-CM

## 2023-12-13 DIAGNOSIS — E03.9 ACQUIRED HYPOTHYROIDISM: ICD-10-CM

## 2023-12-13 DIAGNOSIS — K76.0 FATTY (CHANGE OF) LIVER, NOT ELSEWHERE CLASSIFIED: ICD-10-CM

## 2023-12-13 DIAGNOSIS — R74.8 ABNORMAL LEVELS OF OTHER SERUM ENZYMES: ICD-10-CM

## 2023-12-13 DIAGNOSIS — F41.1 GENERALIZED ANXIETY DISORDER: ICD-10-CM

## 2023-12-13 DIAGNOSIS — R93.2 ABNORMAL FINDINGS ON DIAGNOSTIC IMAGING OF LIVER AND BILIARY TRACT: ICD-10-CM

## 2023-12-13 DIAGNOSIS — F41.9 ANXIETY: ICD-10-CM

## 2023-12-13 DIAGNOSIS — K74.3 PRIMARY BILIARY CIRRHOSIS (MULTI): ICD-10-CM

## 2023-12-13 LAB
ALBUMIN SERPL BCP-MCNC: 4.4 G/DL (ref 3.4–5)
ALP SERPL-CCNC: 127 U/L (ref 33–136)
ALT SERPL W P-5'-P-CCNC: 58 U/L (ref 7–45)
ANION GAP SERPL CALC-SCNC: 13 MMOL/L (ref 10–20)
AST SERPL W P-5'-P-CCNC: 45 U/L (ref 9–39)
BILIRUB DIRECT SERPL-MCNC: 0.2 MG/DL (ref 0–0.3)
BILIRUB SERPL-MCNC: 0.8 MG/DL (ref 0–1.2)
BUN SERPL-MCNC: 10 MG/DL (ref 6–23)
CALCIUM SERPL-MCNC: 9.3 MG/DL (ref 8.6–10.3)
CHLORIDE SERPL-SCNC: 101 MMOL/L (ref 98–107)
CO2 SERPL-SCNC: 25 MMOL/L (ref 21–32)
CREAT SERPL-MCNC: 1.02 MG/DL (ref 0.5–1.05)
ERYTHROCYTE [DISTWIDTH] IN BLOOD BY AUTOMATED COUNT: 12.9 % (ref 11.5–14.5)
GFR SERPL CREATININE-BSD FRML MDRD: 60 ML/MIN/1.73M*2
GLUCOSE SERPL-MCNC: 100 MG/DL (ref 74–99)
HCT VFR BLD AUTO: 44.2 % (ref 36–46)
HGB BLD-MCNC: 15.8 G/DL (ref 12–16)
INR PPP: 1 (ref 0.9–1.1)
MCH RBC QN AUTO: 36.6 PG (ref 26–34)
MCHC RBC AUTO-ENTMCNC: 35.7 G/DL (ref 32–36)
MCV RBC AUTO: 102 FL (ref 80–100)
NRBC BLD-RTO: 0 /100 WBCS (ref 0–0)
PLATELET # BLD AUTO: 223 X10*3/UL (ref 150–450)
POTASSIUM SERPL-SCNC: 4.2 MMOL/L (ref 3.5–5.3)
PROT SERPL-MCNC: 6.9 G/DL (ref 6.4–8.2)
PROTHROMBIN TIME: 11 SECONDS (ref 9.8–12.8)
RBC # BLD AUTO: 4.32 X10*6/UL (ref 4–5.2)
SODIUM SERPL-SCNC: 135 MMOL/L (ref 136–145)
WBC # BLD AUTO: 7.9 X10*3/UL (ref 4.4–11.3)

## 2023-12-13 PROCEDURE — 82105 ALPHA-FETOPROTEIN SERUM: CPT

## 2023-12-13 PROCEDURE — 85027 COMPLETE CBC AUTOMATED: CPT

## 2023-12-13 PROCEDURE — 36415 COLL VENOUS BLD VENIPUNCTURE: CPT

## 2023-12-13 PROCEDURE — 82248 BILIRUBIN DIRECT: CPT

## 2023-12-13 PROCEDURE — 80053 COMPREHEN METABOLIC PANEL: CPT

## 2023-12-13 PROCEDURE — 85610 PROTHROMBIN TIME: CPT

## 2023-12-13 RX ORDER — URSODIOL 500 MG/1
500 TABLET, FILM COATED ORAL 2 TIMES DAILY
Qty: 180 TABLET | Refills: 1 | Status: SHIPPED | OUTPATIENT
Start: 2023-12-13 | End: 2024-05-13

## 2023-12-13 RX ORDER — LISINOPRIL 40 MG/1
40 TABLET ORAL DAILY
Qty: 90 TABLET | Refills: 1 | Status: SHIPPED | OUTPATIENT
Start: 2023-12-13 | End: 2024-05-13

## 2023-12-13 RX ORDER — PANTOPRAZOLE SODIUM 40 MG/1
40 TABLET, DELAYED RELEASE ORAL
Qty: 90 TABLET | Refills: 1 | Status: SHIPPED | OUTPATIENT
Start: 2023-12-13 | End: 2024-05-13

## 2023-12-13 RX ORDER — ALPRAZOLAM 0.5 MG/1
0.5 TABLET ORAL 3 TIMES DAILY PRN
Qty: 40 TABLET | Refills: 1 | OUTPATIENT
Start: 2023-12-13

## 2023-12-13 RX ORDER — DOXAZOSIN 4 MG/1
4 TABLET ORAL DAILY
Qty: 90 TABLET | Refills: 1 | Status: SHIPPED | OUTPATIENT
Start: 2023-12-13 | End: 2024-05-13

## 2023-12-13 RX ORDER — CLOPIDOGREL BISULFATE 75 MG/1
75 TABLET ORAL DAILY
Qty: 90 TABLET | Refills: 1 | Status: SHIPPED | OUTPATIENT
Start: 2023-12-13

## 2023-12-13 RX ORDER — AMLODIPINE BESYLATE 10 MG/1
10 TABLET ORAL DAILY
Qty: 90 TABLET | Refills: 1 | Status: SHIPPED | OUTPATIENT
Start: 2023-12-13

## 2023-12-13 RX ORDER — NITROGLYCERIN 0.4 MG/1
0.4 TABLET SUBLINGUAL EVERY 5 MIN PRN
Qty: 90 TABLET | Refills: 0 | Status: SHIPPED | OUTPATIENT
Start: 2023-12-13

## 2023-12-13 RX ORDER — METOPROLOL TARTRATE 50 MG/1
50 TABLET ORAL EVERY 12 HOURS
Qty: 180 TABLET | Refills: 1 | Status: SHIPPED | OUTPATIENT
Start: 2023-12-13 | End: 2024-05-13

## 2023-12-13 RX ORDER — FLUOXETINE HYDROCHLORIDE 40 MG/1
40 CAPSULE ORAL DAILY
Qty: 90 CAPSULE | Refills: 1 | Status: SHIPPED | OUTPATIENT
Start: 2023-12-13

## 2023-12-14 LAB — AFP SERPL-MCNC: 9 NG/ML (ref 0–9)

## 2023-12-15 DIAGNOSIS — Z78.9 ALCOHOL USE: ICD-10-CM

## 2023-12-15 DIAGNOSIS — K74.3 PRIMARY BILIARY CHOLANGITIS (MULTI): Primary | ICD-10-CM

## 2023-12-28 DIAGNOSIS — F41.1 GENERALIZED ANXIETY DISORDER: ICD-10-CM

## 2023-12-28 RX ORDER — ALPRAZOLAM 0.5 MG/1
0.5 TABLET ORAL 3 TIMES DAILY PRN
Qty: 40 TABLET | Refills: 5 | Status: SHIPPED | OUTPATIENT
Start: 2023-12-28 | End: 2024-05-13

## 2023-12-28 NOTE — TELEPHONE ENCOUNTER
Pt states can be renewed 12/29. Requesting now, since needs to go to Mail order and they are requiring a new script

## 2024-04-03 ENCOUNTER — TELEPHONE (OUTPATIENT)
Dept: GASTROENTEROLOGY | Facility: HOSPITAL | Age: 69
End: 2024-04-03
Payer: MEDICARE

## 2024-04-03 NOTE — TELEPHONE ENCOUNTER
Hepatology Nurse Coordinator Note  Patient called to see what testing is needed prior to her upcoming visit with Dr. Torres. Patient is aware she is past due for blood work and this should be completed prior to her visit. Patient verbalized understanding and is agreeable. She states she will get her blood drawn next week.

## 2024-04-09 ENCOUNTER — LAB (OUTPATIENT)
Dept: LAB | Facility: LAB | Age: 69
End: 2024-04-09
Payer: MEDICARE

## 2024-04-09 DIAGNOSIS — K74.3 PRIMARY BILIARY CHOLANGITIS (MULTI): ICD-10-CM

## 2024-04-09 DIAGNOSIS — Z78.9 ALCOHOL USE: ICD-10-CM

## 2024-04-09 LAB
ALBUMIN SERPL BCP-MCNC: 4.2 G/DL (ref 3.4–5)
ALP SERPL-CCNC: 117 U/L (ref 33–136)
ALT SERPL W P-5'-P-CCNC: 64 U/L (ref 7–45)
ANION GAP SERPL CALC-SCNC: 14 MMOL/L (ref 10–20)
AST SERPL W P-5'-P-CCNC: 54 U/L (ref 9–39)
BASOPHILS # BLD AUTO: 0.06 X10*3/UL (ref 0–0.1)
BASOPHILS NFR BLD AUTO: 0.8 %
BILIRUB SERPL-MCNC: 0.9 MG/DL (ref 0–1.2)
BUN SERPL-MCNC: 9 MG/DL (ref 6–23)
CALCIUM SERPL-MCNC: 9.4 MG/DL (ref 8.6–10.3)
CHLORIDE SERPL-SCNC: 100 MMOL/L (ref 98–107)
CO2 SERPL-SCNC: 26 MMOL/L (ref 21–32)
CREAT SERPL-MCNC: 0.92 MG/DL (ref 0.5–1.05)
EGFRCR SERPLBLD CKD-EPI 2021: 68 ML/MIN/1.73M*2
EOSINOPHIL # BLD AUTO: 0.26 X10*3/UL (ref 0–0.7)
EOSINOPHIL NFR BLD AUTO: 3.7 %
ERYTHROCYTE [DISTWIDTH] IN BLOOD BY AUTOMATED COUNT: 12.6 % (ref 11.5–14.5)
GLUCOSE SERPL-MCNC: 98 MG/DL (ref 74–99)
HCT VFR BLD AUTO: 42.6 % (ref 36–46)
HGB BLD-MCNC: 15 G/DL (ref 12–16)
IMM GRANULOCYTES # BLD AUTO: 0.02 X10*3/UL (ref 0–0.7)
IMM GRANULOCYTES NFR BLD AUTO: 0.3 % (ref 0–0.9)
LYMPHOCYTES # BLD AUTO: 1.87 X10*3/UL (ref 1.2–4.8)
LYMPHOCYTES NFR BLD AUTO: 26.4 %
MCH RBC QN AUTO: 36.5 PG (ref 26–34)
MCHC RBC AUTO-ENTMCNC: 35.2 G/DL (ref 32–36)
MCV RBC AUTO: 104 FL (ref 80–100)
MONOCYTES # BLD AUTO: 0.48 X10*3/UL (ref 0.1–1)
MONOCYTES NFR BLD AUTO: 6.8 %
NEUTROPHILS # BLD AUTO: 4.38 X10*3/UL (ref 1.2–7.7)
NEUTROPHILS NFR BLD AUTO: 62 %
NRBC BLD-RTO: 0 /100 WBCS (ref 0–0)
PLATELET # BLD AUTO: 213 X10*3/UL (ref 150–450)
POTASSIUM SERPL-SCNC: 4 MMOL/L (ref 3.5–5.3)
PROT SERPL-MCNC: 7 G/DL (ref 6.4–8.2)
RBC # BLD AUTO: 4.11 X10*6/UL (ref 4–5.2)
SODIUM SERPL-SCNC: 136 MMOL/L (ref 136–145)
WBC # BLD AUTO: 7.1 X10*3/UL (ref 4.4–11.3)

## 2024-04-09 PROCEDURE — 80053 COMPREHEN METABOLIC PANEL: CPT

## 2024-04-09 PROCEDURE — 36415 COLL VENOUS BLD VENIPUNCTURE: CPT

## 2024-04-09 PROCEDURE — 85025 COMPLETE CBC W/AUTO DIFF WBC: CPT

## 2024-04-11 NOTE — PROGRESS NOTES
Hepatology: Initial Office Note     Patient: Yenny Harvey, a 68 y.o. year old female presents for a follow up visit for elevated liver enzymes, PBC, alcohol related liver ds.   PCP: Shelbi Donald MD    History of Present Illness   PMHx: HTN, CAD, anxiety, alcohol use.      Pertinent hx: Ms Harvey presented for evaluation of long standing hx of elevated liver enzymes, for 4-5 yrs. Significant hx of alcohol use and uses it to deal with her anxiety. Symptoms of loose stools for the last 1-2 yrs, with urgency. Usually small volume stools without blood or mucous. Denied abdominal pain, weight loss, and change in appetite.   Underwent labs and imaging after her visit in Aug 2021. Was noted to have significant elevations in AMA 1:1280, CHEO 1:160 without IgG or ASMA abnormalities. In addition, had an elevated ferritin and TS level. She underwent an ultrasound of the liver which demonstrated cirrhotic liver morphology without features suggestive of portal HTN. Discussed these findings with the pt- diagnosis of PBC was discussed. Possibility of an overlap syndrome could not be excluded. Pt was reluctant to undergo and liver bx for evaluation and wanted to proceed with therapy for PBC. Ursodiol was started 500mg BID dosing. Labs had downtrended from prior elevations.     Today's office visit: She presents for a follow up today.   []   Denies symptoms of RUQ abdominal pain, jaundice, abdominal distention, itching, rectal bleeding, hematemesis, melenic stools.   [] Had labs done recently.   [] alcohol use.      Review of Systems   Constitutional/ General: No fever, no night sweats, no weight loss  Eyes: no yellow discoloration  ENT: normal   Cardiovascular: no chest pain, no palpitations  Respiratory: no shortness of breath  Gastrointestinal: denies abdominal pain, nausea, vomiting  Integumentary: no rashes, no yellow discoloration of skin  Neurologic: no weakness or numbness of extremities  Psychiatric: no mood  fluctuations  Musculoskeletal: no joint swelling   Genitourinary: no dysuria, no hematuria    All other systems have been reviewed and are negative except as noted in the HPI and above.    Meds: Noted and reviewed. No herbal supplements. Ursodiol 500mg bid.   Social hx: Continues to use alcohol regularly, drinks beer. Denies use of drugs or illicit substances, hx of smoking +  Family hx: no hx of liver ds, liver tumors or bile duct tumors in the family  Surgical hx: no intra-abdominal surgeries    Medications     Current Outpatient Medications   Medication Instructions    ALPRAZolam (XANAX) 0.5 mg, oral, 3 times daily PRN    amLODIPine (NORVASC) 10 mg, oral, Daily    clopidogrel (PLAVIX) 75 mg, oral, Daily    doxazosin (CARDURA) 4 mg, oral, Daily    FLUoxetine (PROZAC) 40 mg, oral, Daily    lisinopril 40 mg, oral, Daily    metoprolol tartrate (LOPRESSOR) 50 mg, oral, Every 12 hours    nitroglycerin (NITROSTAT) 0.4 mg, sublingual, Every 5 min PRN    pantoprazole (PROTONIX) 40 mg, oral, Daily before breakfast    ursodiol (ACTIGALL) 500 mg, oral, 2 times daily       Physical Examination   There were no vitals filed for this visit.  There were no vitals filed for this visit.  There is no height or weight on file to calculate BMI.  Constitutional: awake, alert   Eyes: EOMI, anicteric sclera  ENT: no oropharyngeal lesions visualized  Respiratory: Bilateral air entry equal no wheezing  Cardiovascular: regular rate and rhythm, no lower extremity edema  Abdomen: soft, non tender, non distended, no free fluid wave appreciated, bowel sounds present  Integumentary: no wounds on examined skin   Musculoskeletal: no joint swelling   Neurologic: gross motor strength intact, no asterixis  Psychiatric: alert, appropriate mood and affect, oriented to time/place/person    Labs     Lab Results   Component Value Date     04/09/2024    K 4.0 04/09/2024    CREATININE 0.92 04/09/2024    ALBUMIN 4.2 04/09/2024    ALT 64 (H) 04/09/2024     AST 54 (H) 04/09/2024    ALKPHOS 117 04/09/2024    INR 1.0 12/13/2023    AFP 9 12/13/2023    HGB 15.0 04/09/2024     04/09/2024      Lab Results   Component Value Date    ALT 64 (H) 04/09/2024    ALT 58 (H) 12/13/2023    ALT 68 (H) 08/30/2023     (H) 05/16/2023    AST 54 (H) 04/09/2024    AST 45 (H) 12/13/2023    AST 55 (H) 08/30/2023    AST 75 (H) 05/16/2023    ALKPHOS 117 04/09/2024    ALKPHOS 127 12/13/2023    ALKPHOS 138 (H) 08/30/2023    ALKPHOS 126 05/16/2023    BILITOT 0.9 04/09/2024    BILITOT 0.8 12/13/2023    BILITOT 0.9 08/30/2023    BILITOT 1.0 05/16/2023    [id number] 11/02/2022    [id number] 303 11/02/2022     May 2023: Hb 15.1, Plt 152, Na 134, K 4.1, creat 0.98, , , AST 75, Bili 1, AFP 10  Feb 2023: AFP 11, ALT 69, AST 47, , Bili 1.0  Nov 2022: Hb 14.6, Plt 199, WBC 6.9, INR 0.9, Na 137, creat 1.04, , ALT 91, AST 68, Bili 0.8, PETH 464/303  August 2022: WBC 6.6, hemoglobin 15.2, platelets 207, , ALT 86, AST 71, bilirubin 0.9, sodium 136, creatinine 1.05, INR 0.9  March 2022: INR 1, hemoglobin 14.3, platelets 118, , A LT 48, AST 47, bilirubin 0.9  November 2021: AFP 8 Sept 2021: , AST 32, ALT 33, Bili 1, INR 0.9, Na 136, Creat 0.84, normal CBC, ferritin 626, TS 69, Hemochromatosis normal gene  August 2021: , AST 83, ALT 94, Bili 1.3, AMA 1: 1280, HBV sAb 84.8, sAg/core total Ab NR, HCV Ab NR, HAV total reactive, normal ceruloplasmin, CHEO 1:160, Neg ASMA, IgG 1070, normal A1AT, normal ceruloplasmin  June 2021: , , AST 94, Bili 1.2. June 2020: , ALT 69, AST 52.   2017- , 2019-  (Records from [facility])    Imaging   US elastography liver Oct 2023: IMPRESSION: Median liver stiffness 1.5 m/sec.  US liver April 2023: Liver is enlarged- heterogenous, hyperechoic hepatic parenchyma- combination of steatosis and/or chronic hepatocellular ds. Nothing suspicious for HCC.   US liver Oct 2022: Hepatomegaly and  steatosis. Median liver stiffness- 1.65m/sec, 8.34 kPa. (f2)  Ultrasound liver March 2022: Diffuse fatty infiltration of the liver, mild hepatomegaly, no discrete hepatic lesion.  Ultrasound and elastography November 2021: Median liver stiffness 6.45K PA/1.44m/sec.   US liver: Aug 2021: Cirrhotic morphology with no definite sonographic evidence of portal HTN. No focal hepatic lesions.     Assessment and Plan    Yenny Harvey, a 68 y.o. year old female presents for a follow up visit for PBC and alcohol use related liver ds. I have reviewed pertinent provider notes, labs and imaging studies. Discussed results and their interpretation with the patient today.    No diagnosis found.    No orders of the defined types were placed in this encounter.     # Initial cholestatic pattern of liver enzyme elevations: PBC  Initial presentation with significant elevation of ALP and positive AMA titers: she has been diagnosed with Primary Biliary Cholangitis. In addition to AMA, she is also noted to have a significant elevation in CHEO, ferritin and TS without abnormal gene for hemochromatosis.   Liver enzymes have shown a good response with downtrend in ALP and had near normalization of AST and ALT after initiation of ursodiol. Initial US liver showed evidence of cirrhotic morphology of the liver and subsequently US Liver and elastography without evidence of cirrhosis or advanced fibrosis.      She does not have overt features of hepatic decompensation. No jaundice, encephalopathy, coagulopathy, ascites, symptoms of overt GI bleeding.      On recent labs she had elevation of ALT and AST which have been ongoing since Feb 2022. These have worsened on most recent check with ALT up to 4-5 x ULN. Whether this is related to a secondary etiology of liver injury- such as alcohol, metabolic fatty liver ds vs autoimmune overlap with PBC is considered. Pt continues to refuse a liver bx for evaluation. Discussed this again today. Discussed  again the detrimental effects of any alcohol use- given chronic liver ds hx, elevated LFTs and concern for liver fibrosis. In the past pt has deferred pharmacotherapy for alcohol use.      -Continue ursodiol at 500mg bid dosing and intermittent lab monitoring every 3 months.   -Goal for liver enzyme normalization- patient continues to have persistent elevated liver enzymes with optimal ursodiol dosing. As noted above, she has refused a liver bx.   -Will continue q6 month screening with imaging and q yearly screening with elastography.   -Due for US liver this year.   -Recommend check of ELF with next set of labs.   -She has evidence of immunity to HAV and HBV     Follow up in 6 months.

## 2024-04-12 ENCOUNTER — APPOINTMENT (OUTPATIENT)
Dept: GASTROENTEROLOGY | Facility: CLINIC | Age: 69
End: 2024-04-12
Payer: MEDICARE

## 2024-04-18 ENCOUNTER — TELEPHONE (OUTPATIENT)
Dept: GASTROENTEROLOGY | Facility: CLINIC | Age: 69
End: 2024-04-18
Payer: MEDICARE

## 2024-04-18 DIAGNOSIS — K74.3 PRIMARY BILIARY CHOLANGITIS (MULTI): ICD-10-CM

## 2024-04-18 NOTE — TELEPHONE ENCOUNTER
Hepatology Nurse Coordinator Note  Per Dr. Torres, she responded and sent patient a GetMaid message with results and recommendations. Lab orders placed. No further action needed.

## 2024-05-07 ENCOUNTER — OFFICE VISIT (OUTPATIENT)
Dept: PRIMARY CARE | Facility: CLINIC | Age: 69
End: 2024-05-07
Payer: MEDICARE

## 2024-05-07 VITALS
HEIGHT: 60 IN | SYSTOLIC BLOOD PRESSURE: 110 MMHG | HEART RATE: 84 BPM | WEIGHT: 172 LBS | BODY MASS INDEX: 33.77 KG/M2 | TEMPERATURE: 98.2 F | OXYGEN SATURATION: 96 % | DIASTOLIC BLOOD PRESSURE: 70 MMHG

## 2024-05-07 DIAGNOSIS — K74.3 PRIMARY BILIARY CHOLANGITIS (MULTI): ICD-10-CM

## 2024-05-07 DIAGNOSIS — Z79.899 MEDICATION MANAGEMENT: ICD-10-CM

## 2024-05-07 DIAGNOSIS — I10 PRIMARY HYPERTENSION: Primary | ICD-10-CM

## 2024-05-07 DIAGNOSIS — F41.1 GENERALIZED ANXIETY DISORDER: ICD-10-CM

## 2024-05-07 DIAGNOSIS — K74.69 OTHER CIRRHOSIS OF LIVER (MULTI): ICD-10-CM

## 2024-05-07 PROBLEM — M79.601 PAIN IN BOTH UPPER EXTREMITIES: Status: ACTIVE | Noted: 2024-05-07

## 2024-05-07 PROBLEM — K76.0 STEATOSIS OF LIVER: Status: ACTIVE | Noted: 2024-05-07

## 2024-05-07 PROBLEM — M79.9 SOFT TISSUE LESION OF SHOULDER REGION: Status: ACTIVE | Noted: 2024-05-07

## 2024-05-07 PROBLEM — E66.811 CLASS 1 OBESITY: Status: RESOLVED | Noted: 2019-10-17 | Resolved: 2024-05-07

## 2024-05-07 PROBLEM — E66.9 OBESITY: Status: ACTIVE | Noted: 2024-05-07

## 2024-05-07 PROBLEM — R06.02 SHORTNESS OF BREATH ON EXERTION: Status: ACTIVE | Noted: 2024-05-07

## 2024-05-07 PROBLEM — E66.9 OBESITY WITH BODY MASS INDEX 30 OR GREATER: Status: ACTIVE | Noted: 2024-05-07

## 2024-05-07 PROBLEM — E66.3 OVERWEIGHT: Status: ACTIVE | Noted: 2024-05-07

## 2024-05-07 PROBLEM — M25.512 BILATERAL SHOULDER PAIN: Status: ACTIVE | Noted: 2024-05-07

## 2024-05-07 PROBLEM — R74.8 ELEVATED LIVER ENZYMES: Status: ACTIVE | Noted: 2024-05-07

## 2024-05-07 PROBLEM — I25.10 CORONARY ARTERY DISEASE: Status: RESOLVED | Noted: 2023-04-21 | Resolved: 2024-05-07

## 2024-05-07 PROBLEM — E66.3 OVERWEIGHT: Status: RESOLVED | Noted: 2024-05-07 | Resolved: 2024-05-07

## 2024-05-07 PROBLEM — K21.9 GASTROESOPHAGEAL REFLUX DISEASE: Status: RESOLVED | Noted: 2023-04-21 | Resolved: 2024-05-07

## 2024-05-07 PROBLEM — R79.89 ELEVATED FERRITIN: Status: ACTIVE | Noted: 2024-05-07

## 2024-05-07 PROBLEM — K76.0 HEPATIC STEATOSIS: Status: RESOLVED | Noted: 2024-05-07 | Resolved: 2024-05-07

## 2024-05-07 PROBLEM — R74.8 HIGH ALKALINE PHOSPHATASE: Status: ACTIVE | Noted: 2019-10-16

## 2024-05-07 PROBLEM — K75.89 CHOLESTATIC HEPATITIS: Status: ACTIVE | Noted: 2024-05-07

## 2024-05-07 PROBLEM — K74.60 HEPATIC CIRRHOSIS (MULTI): Status: ACTIVE | Noted: 2023-04-21

## 2024-05-07 PROBLEM — E66.9 OBESITY: Status: RESOLVED | Noted: 2024-05-07 | Resolved: 2024-05-07

## 2024-05-07 PROBLEM — M67.911 TENDINOPATHY OF BOTH SHOULDERS: Status: ACTIVE | Noted: 2024-05-07

## 2024-05-07 PROBLEM — F17.200 NICOTINE DEPENDENCE: Status: ACTIVE | Noted: 2023-04-21

## 2024-05-07 PROBLEM — M79.602 PAIN IN BOTH UPPER EXTREMITIES: Status: ACTIVE | Noted: 2024-05-07

## 2024-05-07 PROBLEM — M67.912 TENDINOPATHY OF BOTH SHOULDERS: Status: ACTIVE | Noted: 2024-05-07

## 2024-05-07 PROBLEM — K21.9 GASTROESOPHAGEAL REFLUX DISEASE: Status: ACTIVE | Noted: 2023-04-21

## 2024-05-07 PROBLEM — E66.9 CLASS 1 OBESITY: Status: ACTIVE | Noted: 2019-10-17

## 2024-05-07 PROBLEM — E66.9 CLASS 1 OBESITY: Status: RESOLVED | Noted: 2019-10-17 | Resolved: 2024-05-07

## 2024-05-07 PROBLEM — M25.511 BILATERAL SHOULDER PAIN: Status: ACTIVE | Noted: 2024-05-07

## 2024-05-07 PROBLEM — E66.811 CLASS 1 OBESITY: Status: ACTIVE | Noted: 2019-10-17

## 2024-05-07 PROBLEM — R92.8 ABNORMAL MAMMOGRAM OF LEFT BREAST: Status: ACTIVE | Noted: 2024-05-07

## 2024-05-07 PROBLEM — R06.02 SHORTNESS OF BREATH ON EXERTION: Status: RESOLVED | Noted: 2024-05-07 | Resolved: 2024-05-07

## 2024-05-07 PROBLEM — E66.9 OBESITY WITH BODY MASS INDEX 30 OR GREATER: Status: RESOLVED | Noted: 2024-05-07 | Resolved: 2024-05-07

## 2024-05-07 PROCEDURE — 1123F ACP DISCUSS/DSCN MKR DOCD: CPT | Performed by: FAMILY MEDICINE

## 2024-05-07 PROCEDURE — 1126F AMNT PAIN NOTED NONE PRSNT: CPT | Performed by: FAMILY MEDICINE

## 2024-05-07 PROCEDURE — 80365 DRUG SCREENING OXYCODONE: CPT

## 2024-05-07 PROCEDURE — 80307 DRUG TEST PRSMV CHEM ANLYZR: CPT

## 2024-05-07 PROCEDURE — 80354 DRUG SCREENING FENTANYL: CPT

## 2024-05-07 PROCEDURE — 80361 OPIATES 1 OR MORE: CPT

## 2024-05-07 PROCEDURE — 82570 ASSAY OF URINE CREATININE: CPT

## 2024-05-07 PROCEDURE — 1160F RVW MEDS BY RX/DR IN RCRD: CPT | Performed by: FAMILY MEDICINE

## 2024-05-07 PROCEDURE — 3078F DIAST BP <80 MM HG: CPT | Performed by: FAMILY MEDICINE

## 2024-05-07 PROCEDURE — 80346 BENZODIAZEPINES1-12: CPT

## 2024-05-07 PROCEDURE — 80368 SEDATIVE HYPNOTICS: CPT

## 2024-05-07 PROCEDURE — 1159F MED LIST DOCD IN RCRD: CPT | Performed by: FAMILY MEDICINE

## 2024-05-07 PROCEDURE — 1158F ADVNC CARE PLAN TLK DOCD: CPT | Performed by: FAMILY MEDICINE

## 2024-05-07 PROCEDURE — 99213 OFFICE O/P EST LOW 20 MIN: CPT | Performed by: FAMILY MEDICINE

## 2024-05-07 PROCEDURE — 80358 DRUG SCREENING METHADONE: CPT

## 2024-05-07 PROCEDURE — 3074F SYST BP LT 130 MM HG: CPT | Performed by: FAMILY MEDICINE

## 2024-05-07 PROCEDURE — 80373 DRUG SCREENING TRAMADOL: CPT

## 2024-05-07 ASSESSMENT — ENCOUNTER SYMPTOMS
NERVOUS/ANXIOUS: 1
SORE THROAT: 0
VOMITING: 0
SHORTNESS OF BREATH: 0
FREQUENCY: 0
COUGH: 0
PALPITATIONS: 0
DIZZINESS: 0
DECREASED CONCENTRATION: 0
CONFUSION: 0
HEMATURIA: 0
FATIGUE: 0
WEAKNESS: 0
NUMBNESS: 0
BLOOD IN STOOL: 0
JOINT SWELLING: 0
UNEXPECTED WEIGHT CHANGE: 0
DIARRHEA: 0
TROUBLE SWALLOWING: 0
ABDOMINAL PAIN: 0
HALLUCINATIONS: 0
HEADACHES: 0
NAUSEA: 0
EYE PAIN: 0
DYSURIA: 0
FEVER: 0

## 2024-05-07 ASSESSMENT — PAIN SCALES - GENERAL: PAINLEVEL: 0-NO PAIN

## 2024-05-07 ASSESSMENT — PATIENT HEALTH QUESTIONNAIRE - PHQ9
SUM OF ALL RESPONSES TO PHQ9 QUESTIONS 1 AND 2: 0
1. LITTLE INTEREST OR PLEASURE IN DOING THINGS: NOT AT ALL
2. FEELING DOWN, DEPRESSED OR HOPELESS: NOT AT ALL

## 2024-05-07 ASSESSMENT — ANXIETY QUESTIONNAIRES
6. BECOMING EASILY ANNOYED OR IRRITABLE: SEVERAL DAYS
GAD7 TOTAL SCORE: 7
1. FEELING NERVOUS, ANXIOUS, OR ON EDGE: SEVERAL DAYS
3. WORRYING TOO MUCH ABOUT DIFFERENT THINGS: SEVERAL DAYS
4. TROUBLE RELAXING: SEVERAL DAYS
5. BEING SO RESTLESS THAT IT IS HARD TO SIT STILL: SEVERAL DAYS
2. NOT BEING ABLE TO STOP OR CONTROL WORRYING: SEVERAL DAYS
7. FEELING AFRAID AS IF SOMETHING AWFUL MIGHT HAPPEN: SEVERAL DAYS
IF YOU CHECKED OFF ANY PROBLEMS ON THIS QUESTIONNAIRE, HOW DIFFICULT HAVE THESE PROBLEMS MADE IT FOR YOU TO DO YOUR WORK, TAKE CARE OF THINGS AT HOME, OR GET ALONG WITH OTHER PEOPLE: SOMEWHAT DIFFICULT

## 2024-05-07 NOTE — PATIENT INSTRUCTIONS
It was nice to see you today!  Discussed current concerns and addressed   Reviewed recent labs and diagnostics  Reviewed medications list  Continue to eat a healthy diet, exercise at least 3 times a week or more  Plan and follow up discussed  For any further information related to your condition, copy and paste or go to [email or web address]  No alcohol with Xanax  Behavioral techniques to get anxiety down  Needs to follow-up for physical and labs

## 2024-05-07 NOTE — PROGRESS NOTES
Subjective   Patient ID: Yenny Harvey is a 69 y.o. female.    Patient here for Xanax refill.  She is due for UDS and CSA.  She is seeing hepatology for cirrhosis.  Is due to cholestasis.  Unfortunately she is still drinking alcohol but sounds like she has cut down.Blood pressure is under good control.  BMI is 33.59.  She is a smoker.    OARRS:  No data recorded  I have personally reviewed the OARRS report for Yenny Harvey. I have considered the risks of abuse, dependence, addiction and diversion and I believe that it is clinically appropriate for Yenny Harvey to be prescribed this medication    Is the patient prescribed a combination of a benzodiazepine and opioid?  No    Last Urine Drug Screen / ordered today: Yes  No results found for this or any previous visit (from the past 8760 hour(s)).  Results are as expected.         Controlled Substance Agreement:  Date of the Last Agreement: today  Reviewed Controlled Substance Agreement including but not limited to the benefits, risks, and alternatives to treatment with a Controlled Substance medication(s).    Benzodiazepines:  What is the patient's goal of therapy? Less anxiety  Is this being achieved with current treatment? yes    PREMA-7:  Over the last 2 weeks, how often have you been bothered by any of the following problems?  Feeling nervous, anxious, or on edge: 1  Not being able to stop or control worryin  Worrying too much about different things: 1  Trouble relaxin  Being so restless that it is hard to sit still: 1  Becoming easily annoyed or irritable: 1  Feeling afraid as if something awful might happen: 1  PREMA-7 Total Score: 7        Activities of Daily Living:   Is your overall impression that this patient is benefiting (symptom reduction outweighs side effects) from benzodiazepine therapy? Yes     1. Physical Functioning: Same  2. Family Relationship: Better  3. Social Relationship: Better  4. Mood: Better  5. Sleep Patterns: Better  6. Overall  Function: Better    Review of Systems   Constitutional:  Negative for fatigue, fever and unexpected weight change.   HENT:  Negative for congestion, ear pain, hearing loss, sore throat and trouble swallowing.    Eyes:  Negative for pain and visual disturbance.   Respiratory:  Negative for cough and shortness of breath.    Cardiovascular:  Negative for chest pain, palpitations and leg swelling.   Gastrointestinal:  Negative for abdominal pain, blood in stool, diarrhea, nausea and vomiting.   Genitourinary:  Negative for dysuria, frequency, hematuria and urgency.   Musculoskeletal:  Negative for joint swelling.   Skin:  Negative for pallor and rash.   Neurological:  Negative for dizziness, syncope, weakness, numbness and headaches.   Psychiatric/Behavioral:  Negative for confusion, decreased concentration, hallucinations and suicidal ideas. The patient is nervous/anxious.      Vitals:    05/07/24 1129   BP: 110/70   Pulse: 84   Temp: 36.8 °C (98.2 °F)   SpO2: 96%      Objective   Physical Exam  Constitutional:       Appearance: Normal appearance.   Cardiovascular:      Rate and Rhythm: Normal rate and regular rhythm.      Heart sounds: Normal heart sounds.   Pulmonary:      Effort: Pulmonary effort is normal.      Breath sounds: Normal breath sounds.   Musculoskeletal:      Cervical back: Neck supple.   Skin:     General: Skin is warm and dry.   Neurological:      General: No focal deficit present.      Mental Status: She is alert.   Psychiatric:         Mood and Affect: Mood normal.         Speech: Speech normal.         Behavior: Behavior normal.         Cognition and Memory: Cognition normal.         Assessment/Plan   Diagnoses and all orders for this visit:  Primary hypertension  Other cirrhosis of liver (Multi)  Primary biliary cholangitis (Multi)  Medication management  -     Opiate/Opioid/Benzo Prescription Compliance  -     OOB Internal Tracking  Generalized anxiety disorder

## 2024-05-08 LAB
AMPHETAMINES UR QL SCN: NORMAL
BARBITURATES UR QL SCN: NORMAL
BZE UR QL SCN: NORMAL
CANNABINOIDS UR QL SCN: NORMAL
CREAT UR-MCNC: 59.9 MG/DL (ref 20–320)
PCP UR QL SCN: NORMAL

## 2024-05-10 LAB
1OH-MIDAZOLAM UR CFM-MCNC: <25 NG/ML
6MAM UR CFM-MCNC: <25 NG/ML
7AMINOCLONAZEPAM UR CFM-MCNC: <25 NG/ML
A-OH ALPRAZ UR CFM-MCNC: 122 NG/ML
ALPRAZ UR CFM-MCNC: 132 NG/ML
CHLORDIAZEP UR CFM-MCNC: <25 NG/ML
CLONAZEPAM UR CFM-MCNC: <25 NG/ML
CODEINE UR CFM-MCNC: <50 NG/ML
DIAZEPAM UR CFM-MCNC: <25 NG/ML
EDDP UR CFM-MCNC: <25 NG/ML
FENTANYL UR CFM-MCNC: <2.5 NG/ML
HYDROCODONE CTO UR CFM-MCNC: <25 NG/ML
HYDROMORPHONE UR CFM-MCNC: <25 NG/ML
LORAZEPAM UR CFM-MCNC: <25 NG/ML
METHADONE UR CFM-MCNC: <25 NG/ML
MIDAZOLAM UR CFM-MCNC: <25 NG/ML
MORPHINE UR CFM-MCNC: <50 NG/ML
NORDIAZEPAM UR CFM-MCNC: <25 NG/ML
NORFENTANYL UR CFM-MCNC: <2.5 NG/ML
NORHYDROCODONE UR CFM-MCNC: <25 NG/ML
NOROXYCODONE UR CFM-MCNC: <25 NG/ML
NORTRAMADOL UR-MCNC: <50 NG/ML
OXAZEPAM UR CFM-MCNC: <25 NG/ML
OXYCODONE UR CFM-MCNC: <25 NG/ML
OXYMORPHONE UR CFM-MCNC: <25 NG/ML
TEMAZEPAM UR CFM-MCNC: <25 NG/ML
TRAMADOL UR CFM-MCNC: <50 NG/ML
ZOLPIDEM UR CFM-MCNC: <25 NG/ML
ZOLPIDEM UR-MCNC: <25 NG/ML

## 2024-05-13 DIAGNOSIS — I10 PRIMARY HYPERTENSION: ICD-10-CM

## 2024-05-13 DIAGNOSIS — F41.1 GENERALIZED ANXIETY DISORDER: ICD-10-CM

## 2024-05-13 DIAGNOSIS — E03.9 ACQUIRED HYPOTHYROIDISM: ICD-10-CM

## 2024-05-13 DIAGNOSIS — F41.9 ANXIETY: ICD-10-CM

## 2024-05-13 RX ORDER — DOXAZOSIN 4 MG/1
4 TABLET ORAL DAILY
Qty: 90 TABLET | Refills: 1 | Status: SHIPPED | OUTPATIENT
Start: 2024-05-13

## 2024-05-13 RX ORDER — METOPROLOL TARTRATE 50 MG/1
50 TABLET ORAL 2 TIMES DAILY
Qty: 180 TABLET | Refills: 3 | Status: SHIPPED | OUTPATIENT
Start: 2024-05-13

## 2024-05-13 RX ORDER — ALPRAZOLAM 0.5 MG/1
0.5 TABLET ORAL 3 TIMES DAILY PRN
Qty: 40 TABLET | Refills: 0 | Status: SHIPPED | OUTPATIENT
Start: 2024-05-13

## 2024-05-13 RX ORDER — PANTOPRAZOLE SODIUM 40 MG/1
TABLET, DELAYED RELEASE ORAL
Qty: 90 TABLET | Refills: 1 | Status: SHIPPED | OUTPATIENT
Start: 2024-05-13

## 2024-05-13 RX ORDER — LISINOPRIL 40 MG/1
40 TABLET ORAL DAILY
Qty: 90 TABLET | Refills: 1 | Status: SHIPPED | OUTPATIENT
Start: 2024-05-13

## 2024-05-13 RX ORDER — URSODIOL 500 MG/1
500 TABLET, FILM COATED ORAL 2 TIMES DAILY
Qty: 180 TABLET | Refills: 1 | Status: SHIPPED | OUTPATIENT
Start: 2024-05-13

## 2024-06-22 DIAGNOSIS — F41.1 GENERALIZED ANXIETY DISORDER: ICD-10-CM

## 2024-06-24 RX ORDER — ALPRAZOLAM 0.5 MG/1
0.5 TABLET ORAL 3 TIMES DAILY PRN
Qty: 40 TABLET | Refills: 1 | OUTPATIENT
Start: 2024-06-24

## 2024-06-25 ENCOUNTER — TELEPHONE (OUTPATIENT)
Dept: PRIMARY CARE | Facility: CLINIC | Age: 69
End: 2024-06-25
Payer: MEDICARE

## 2024-06-25 DIAGNOSIS — F41.1 GENERALIZED ANXIETY DISORDER: ICD-10-CM

## 2024-06-26 RX ORDER — ALPRAZOLAM 0.5 MG/1
0.5 TABLET ORAL 3 TIMES DAILY PRN
Qty: 40 TABLET | OUTPATIENT
Start: 2024-06-26

## 2024-06-26 NOTE — TELEPHONE ENCOUNTER
She had one filled 5/14 for #40 then another #40 on 5/30. That's 80 pills in 5 weeks. She's not getting refill yet

## 2024-06-26 NOTE — TELEPHONE ENCOUNTER
Spoke with patient. Spoke with Dr. Donald. Dr. Donald states will not renew sooner than 10 days,s darrian she has 10 pills left

## 2024-07-02 DIAGNOSIS — F41.1 GENERALIZED ANXIETY DISORDER: ICD-10-CM

## 2024-07-02 NOTE — TELEPHONE ENCOUNTER
Patient calling in. Concerned with the holiday and mail order pharmacy that they will be closed and she will not get them in time. States she has 4 pills left. Last message from Dr. Donald states she will not fill sooner than the 6th and that is a Saturday. Patient does not believe pharmacy is open on Sunday.

## 2024-07-03 RX ORDER — ALPRAZOLAM 0.5 MG/1
0.5 TABLET ORAL 3 TIMES DAILY PRN
Qty: 40 TABLET | Refills: 0 | Status: SHIPPED | OUTPATIENT
Start: 2024-07-03

## 2024-07-24 DIAGNOSIS — F41.1 GENERALIZED ANXIETY DISORDER: ICD-10-CM

## 2024-07-24 DIAGNOSIS — F41.9 ANXIETY: ICD-10-CM

## 2024-07-24 DIAGNOSIS — I10 PRIMARY HYPERTENSION: ICD-10-CM

## 2024-07-24 DIAGNOSIS — E03.9 ACQUIRED HYPOTHYROIDISM: ICD-10-CM

## 2024-07-24 RX ORDER — URSODIOL 500 MG/1
500 TABLET, FILM COATED ORAL 2 TIMES DAILY
Qty: 180 TABLET | Refills: 1 | Status: SHIPPED | OUTPATIENT
Start: 2024-07-24

## 2024-07-24 NOTE — TELEPHONE ENCOUNTER
Hepatology Nurse Coordinator Note  Patient left a voicemail stating she needed a refill on Pantoprazole twice daily. Called patient to clarify as she is only on this daily. Patient clarified she needed the Ursodiol refilled, not pantoprazole. She's aware I will forward the rest to Dr. Torres. She verbalized understanding.

## 2024-07-30 ENCOUNTER — LAB (OUTPATIENT)
Dept: LAB | Facility: LAB | Age: 69
End: 2024-07-30
Payer: MEDICARE

## 2024-07-30 DIAGNOSIS — K74.3 PRIMARY BILIARY CHOLANGITIS (MULTI): ICD-10-CM

## 2024-07-30 LAB
ALBUMIN SERPL BCP-MCNC: 4.2 G/DL (ref 3.4–5)
ALP SERPL-CCNC: 145 U/L (ref 33–136)
ALT SERPL W P-5'-P-CCNC: 74 U/L (ref 7–45)
AST SERPL W P-5'-P-CCNC: 60 U/L (ref 9–39)
BILIRUB DIRECT SERPL-MCNC: 0.3 MG/DL (ref 0–0.3)
BILIRUB SERPL-MCNC: 0.8 MG/DL (ref 0–1.2)
PROT SERPL-MCNC: 7 G/DL (ref 6.4–8.2)

## 2024-07-30 PROCEDURE — 36415 COLL VENOUS BLD VENIPUNCTURE: CPT

## 2024-07-30 PROCEDURE — 80076 HEPATIC FUNCTION PANEL: CPT

## 2024-08-01 NOTE — PROGRESS NOTES
Hepatology: Follow up Office Note      Patient: Yenny Harvey, a 69 y.o. year old female presents for a follow up visit for elevated liver enzymes, PBC, alcohol related liver ds.   PCP: Shelbi Donald MD     History of Present Illness   PMHx: HTN, CAD, anxiety, alcohol use.      Pertinent hx: Ms Harvey presented for an initial evaluation in Aug 2021 of long standing hx of elevated liver enzymes, for 4-5 yrs. Significant hx of alcohol use and noted using this to deal with her anxiety. Symptoms of loose stools for the last 1-2 yrs, with urgency. Usually small volume stools without blood or mucous. Denied abdominal pain, weight loss, and change in appetite.   Underwent labs and imaging after her visit in Aug 2021. Was noted to have significant elevations in AMA 1:1280, CHEO 1:160 without IgG or ASMA abnormalities. In addition, had an elevated ferritin and TS level. She underwent an ultrasound of the liver which demonstrated cirrhotic liver morphology without features suggestive of portal HTN. Discussed these findings with the pt- diagnosis of PBC was discussed. Possibility of an overlap syndrome could not be excluded. Pt was reluctant to undergo and liver bx for evaluation and wanted to proceed with therapy for PBC. Ursodiol was started 500mg BID dosing. Labs had downtrended from prior elevations.   She has continued to have alcohol use in the setting of chronic liver ds and PBC. Liver enzymes have fluctuated. She has continued to decline a liver bx for evaluation.      Today's office visit: She presents for a follow up today. Last visit was in May 2023. Pt cancelled visit in April 2024. Had labs done for LFT check this week. Noted to have elevations in ALT/AST and ALP. She notes compliance with ursodiol. Continues to consume alcohol on a daily basis. Noted use of 28 cans of beer in a week. Denies acute symptoms.      Review of Systems   Constitutional/ General: No fever   Eyes: no yellow discoloration  ENT: normal    Cardiovascular: no chest pain, no palpitations  Respiratory: no shortness of breath  Gastrointestinal: denies abdominal pain, nausea, vomiting  Integumentary: no rashes, no yellow discoloration of skin  Neurologic: no weakness or numbness of extremities  Psychiatric: anxiety  Genitourinary: no hematuria     All other systems have been reviewed and are negative except as noted in the HPI and above.     Meds: Noted and reviewed. No herbal supplements. Ursodiol 500mg bid.   Social hx: Continues to use alcohol regularly, drinks beer. Denies use of drugs or illicit substances, hx of smoking +  Family hx: no hx of liver ds, liver tumors or bile duct tumors in the family  Surgical hx: no intra-abdominal surgeries     Medications     Current Outpatient Medications   Medication Instructions    ALPRAZolam (XANAX) 0.5 mg, oral, 3 times daily PRN    amLODIPine (NORVASC) 10 mg, oral, Daily    Cardura 4 mg, oral, Daily    clopidogrel (PLAVIX) 75 mg, oral, Daily    FLUoxetine (PROZAC) 40 mg, oral, Daily    lisinopril 40 mg, oral, Daily    metoprolol tartrate (LOPRESSOR) 50 mg, oral, 2 times daily    nitroglycerin (NITROSTAT) 0.4 mg, sublingual, Every 5 min PRN    Protonix 40 mg EC tablet TAKE 1 TABLET (40 MG) BY MOUTH ONCE A DAY IN THE MORNING BEFORE A MEAL    ursodiol (ACTIGALL) 500 mg, oral, 2 times daily      Physical Examination      Vitals:    08/02/24 1306   BP: (!) 164/95   Pulse: 67   Resp: 16   SpO2: 96%     Vitals:    08/02/24 1306   Weight: 76.2 kg (168 lb 1.6 oz)     Body mass index is 32.83 kg/m².    Constitutional: awake, alert   Eyes: EOMI, anicteric sclera  Respiratory: Bilateral air entry equal   Cardiovascular: regular rate and rhythm, no lower extremity edema  Abdomen: soft, non tender, increased abdominal adiposity, bowel sounds present  Neurologic: gross motor strength intact  Psychiatric: alert, oriented to time/place/person     Labs      Lab Results   Component Value Date    ALKPHOS 145 (H) 07/30/2024     ALKPHOS 117 04/09/2024    ALKPHOS 127 12/13/2023    ALKPHOS 138 (H) 08/30/2023    ALT 74 (H) 07/30/2024    ALT 64 (H) 04/09/2024    ALT 58 (H) 12/13/2023    ALT 68 (H) 08/30/2023    AST 60 (H) 07/30/2024    AST 54 (H) 04/09/2024    AST 45 (H) 12/13/2023    AST 55 (H) 08/30/2023    BILITOT 0.8 07/30/2024    BILITOT 0.9 04/09/2024    BILITOT 0.8 12/13/2023    BILITOT 0.9 08/30/2023    GGT 2,594 (H) 08/13/2021    WBC 7.1 04/09/2024     04/09/2024    HGB 15.0 04/09/2024    CREATININE 0.92 04/09/2024     04/09/2024    AFP 9 12/13/2023     Lab Results   Component Value Date    GKVO145 464 11/02/2022    ARDH578 303 11/02/2022     May 2023: Hb 15.1, Plt 152, Na 134, K 4.1, creat 0.98, , , AST 75, Bili 1, AFP 10  Feb 2023: AFP 11, ALT 69, AST 47, , Bili 1.0  Nov 2022: Hb 14.6, Plt 199, WBC 6.9, INR 0.9, Na 137, creat 1.04, , ALT 91, AST 68, Bili 0.8, PETH 464/303  August 2022: WBC 6.6, hemoglobin 15.2, platelets 207, , ALT 86, AST 71, bilirubin 0.9, sodium 136, creatinine 1.05, INR 0.9  March 2022: INR 1, hemoglobin 14.3, platelets 118, , A LT 48, AST 47, bilirubin 0.9  November 2021: AFP 8 Sept 2021: , AST 32, ALT 33, Bili 1, INR 0.9, Na 136, Creat 0.84, normal CBC, ferritin 626, TS 69, Hemochromatosis normal gene  August 2021: , AST 83, ALT 94, Bili 1.3, AMA 1: 1280, HBV sAb 84.8, sAg/core total Ab NR, HCV Ab NR, HAV total reactive, normal ceruloplasmin, CHEO 1:160, Neg ASMA, IgG 1070, normal A1AT, normal ceruloplasmin  June 2021: , , AST 94, Bili 1.2. June 2020: , ALT 69, AST 52.   2017- , 2019-  (Records from [facility])     Imaging   US elastography liver Oct 2023: IMPRESSION: Median liver stiffness 1.5 m/sec.  US liver April 2023: Liver is enlarged- heterogenous, hyperechoic hepatic parenchyma- combination of steatosis and/or chronic hepatocellular ds. Nothing suspicious for HCC.   US liver Oct 2022: Hepatomegaly and  steatosis. Median liver stiffness- 1.65m/sec, 8.34 kPa. (f2)  Ultrasound liver March 2022: Diffuse fatty infiltration of the liver, mild hepatomegaly, no discrete hepatic lesion.  Ultrasound and elastography November 2021: Median liver stiffness 6.45K PA/1.44m/sec.   US liver: Aug 2021: Cirrhotic morphology with no definite sonographic evidence of portal HTN. No focal hepatic lesions.      Assessment and Plan    Yenny Harvey, a 69 y.o. year old female presents for a follow up visit for PBC and alcohol use related liver ds. I have reviewed pertinent provider notes, labs and imaging studies. Discussed results and their interpretation with the patient today.     Encounter Diagnoses   Name Primary?    Primary biliary cholangitis (Multi) Yes    Elevated liver enzymes     Chronic alcohol use      Orders Placed This Encounter   Procedures    US abdomen limited liver    US elastography parenchyma EG organ    Enhanced Liver Fibrosis Test (ELF); [facility]; 197052 - Miscellaneous Test    Hepatic Function Panel    IgM      # AMA positive PBC and component of AUD related liver disease  Initial presentation with significant elevation (5-6 x ULN) of ALP and positive AMA titers: she has been diagnosed with Primary Biliary Cholangitis.   In addition to AMA, she is also noted to have a significant elevation in CHEO, ferritin and TS without HFE mutation.  Liver enzymes had shown a good response with downtrend in ALP and had near normalization of AST and ALT after initiation of ursodiol. Initial US liver showed evidence of cirrhotic morphology of the liver and subsequently US Liver and elastography without evidence of cirrhosis or advanced fibrosis.        She has had elevation of ALT and AST which have been ongoing since Feb 2022. These have fluctuated. Whether this is related to a secondary etiology of liver injury- such as alcohol, metabolic fatty liver ds vs autoimmune overlap with PBC is considered.   Pt continues to refuse a liver  bx for evaluation. Discussed this again today. Will plan to check IgM with next lab check. In the absence of bx- would be difficult to ascertain if the elevated LFTs are marker of ds activity from PBC vs alcohol use related liver ds.   Discussed again the detrimental effects of any alcohol use- given chronic liver ds hx, elevated LFTs and concern for liver fibrosis. In the past pt has deferred pharmacotherapy for alcohol use.   I have also reviewed with the pt that in case of progression of liver ds, development of cirrhosis/decompensated liver ds- she would not be a candidate for liver txp eval due to her hx of chronic alcohol use in the setting of liver disease. She verbalized understanding.      -Continue ursodiol at 500mg bid dosing and intermittent lab monitoring every 3 months.   -Goal for liver enzyme normalization- patient continues to have persistent elevated liver enzymes with optimal ursodiol dosing. As noted above, she has refused a liver bx.   -Recommend US liver with elastography this year.    -Recommend check of ELF with next set of labs including LFT and IgM.   -She has evidence of immunity to HAV and HBV     Follow up in 6 months.

## 2024-08-02 ENCOUNTER — APPOINTMENT (OUTPATIENT)
Dept: GASTROENTEROLOGY | Facility: CLINIC | Age: 69
End: 2024-08-02
Payer: MEDICARE

## 2024-08-02 VITALS
DIASTOLIC BLOOD PRESSURE: 95 MMHG | WEIGHT: 168.1 LBS | OXYGEN SATURATION: 96 % | BODY MASS INDEX: 33 KG/M2 | RESPIRATION RATE: 16 BRPM | SYSTOLIC BLOOD PRESSURE: 164 MMHG | HEART RATE: 67 BPM | HEIGHT: 60 IN

## 2024-08-02 DIAGNOSIS — K74.3 PRIMARY BILIARY CHOLANGITIS (MULTI): Primary | ICD-10-CM

## 2024-08-02 DIAGNOSIS — R74.8 ELEVATED LIVER ENZYMES: ICD-10-CM

## 2024-08-02 DIAGNOSIS — F10.90 CHRONIC ALCOHOL USE: ICD-10-CM

## 2024-08-02 PROCEDURE — 1159F MED LIST DOCD IN RCRD: CPT | Performed by: STUDENT IN AN ORGANIZED HEALTH CARE EDUCATION/TRAINING PROGRAM

## 2024-08-02 PROCEDURE — 3008F BODY MASS INDEX DOCD: CPT | Performed by: STUDENT IN AN ORGANIZED HEALTH CARE EDUCATION/TRAINING PROGRAM

## 2024-08-02 PROCEDURE — 99214 OFFICE O/P EST MOD 30 MIN: CPT | Performed by: STUDENT IN AN ORGANIZED HEALTH CARE EDUCATION/TRAINING PROGRAM

## 2024-08-02 PROCEDURE — 1123F ACP DISCUSS/DSCN MKR DOCD: CPT | Performed by: STUDENT IN AN ORGANIZED HEALTH CARE EDUCATION/TRAINING PROGRAM

## 2024-08-02 PROCEDURE — 3077F SYST BP >= 140 MM HG: CPT | Performed by: STUDENT IN AN ORGANIZED HEALTH CARE EDUCATION/TRAINING PROGRAM

## 2024-08-02 PROCEDURE — 1126F AMNT PAIN NOTED NONE PRSNT: CPT | Performed by: STUDENT IN AN ORGANIZED HEALTH CARE EDUCATION/TRAINING PROGRAM

## 2024-08-02 PROCEDURE — 3080F DIAST BP >= 90 MM HG: CPT | Performed by: STUDENT IN AN ORGANIZED HEALTH CARE EDUCATION/TRAINING PROGRAM

## 2024-08-02 ASSESSMENT — PAIN SCALES - GENERAL: PAINLEVEL: 0-NO PAIN

## 2024-08-02 NOTE — PATIENT INSTRUCTIONS
Yenny Harvey,     Thank you for coming in for your hepatology office visit today. As per our discussion, I recommend:     Have labs done in 3-4 months.   Have an ultrasound of the liver and elastography, call [phone].   Continue ursodiol.   I recommend stop use of alcohol. With PBC, it can increase the risk of liver related complications and risk of advancement in liver disease.     I would like to see you back in the office in 6 months.   If you are not provided with a date for your follow up visit at the end of your encounter today at the check out desk, I would encourage you to call [phone] to schedule your appointment with me.   If you have any trouble or need assistance with having this done, please reach out to my 's office at [phone] (Ms Marry Luna) or my nurse coordinator at [phone] (Ms Onelia LÓPEZ).     I look forward to seeing you again. Thank you for allowing me to participate in your care.     Sincerely,  Rowena Torres MD  Hepatology

## 2024-08-20 DIAGNOSIS — F41.1 GENERALIZED ANXIETY DISORDER: ICD-10-CM

## 2024-08-21 RX ORDER — ALPRAZOLAM 0.5 MG/1
0.5 TABLET ORAL 3 TIMES DAILY PRN
Qty: 40 TABLET | Refills: 0 | Status: SHIPPED | OUTPATIENT
Start: 2024-08-21

## 2024-09-26 ENCOUNTER — TELEPHONE (OUTPATIENT)
Dept: PRIMARY CARE | Facility: CLINIC | Age: 69
End: 2024-09-26
Payer: MEDICARE

## 2024-09-26 NOTE — TELEPHONE ENCOUNTER
Pt called wanting to know if she will need to make an appt to be seen to get her next refill on Alprazolam and fluoxetine.

## 2024-10-04 ENCOUNTER — APPOINTMENT (OUTPATIENT)
Dept: PRIMARY CARE | Facility: CLINIC | Age: 69
End: 2024-10-04
Payer: MEDICARE

## 2024-10-04 VITALS
DIASTOLIC BLOOD PRESSURE: 86 MMHG | HEIGHT: 60 IN | WEIGHT: 171 LBS | TEMPERATURE: 97.1 F | SYSTOLIC BLOOD PRESSURE: 138 MMHG | BODY MASS INDEX: 33.57 KG/M2 | HEART RATE: 71 BPM | OXYGEN SATURATION: 95 %

## 2024-10-04 DIAGNOSIS — F41.1 GENERALIZED ANXIETY DISORDER: ICD-10-CM

## 2024-10-04 PROCEDURE — 99213 OFFICE O/P EST LOW 20 MIN: CPT | Performed by: FAMILY MEDICINE

## 2024-10-04 PROCEDURE — 3075F SYST BP GE 130 - 139MM HG: CPT | Performed by: FAMILY MEDICINE

## 2024-10-04 PROCEDURE — 3079F DIAST BP 80-89 MM HG: CPT | Performed by: FAMILY MEDICINE

## 2024-10-04 PROCEDURE — 3008F BODY MASS INDEX DOCD: CPT | Performed by: FAMILY MEDICINE

## 2024-10-04 PROCEDURE — 1159F MED LIST DOCD IN RCRD: CPT | Performed by: FAMILY MEDICINE

## 2024-10-04 PROCEDURE — 1123F ACP DISCUSS/DSCN MKR DOCD: CPT | Performed by: FAMILY MEDICINE

## 2024-10-04 RX ORDER — ALPRAZOLAM 0.5 MG/1
0.5 TABLET ORAL 3 TIMES DAILY PRN
Qty: 40 TABLET | Refills: 5 | Status: SHIPPED | OUTPATIENT
Start: 2024-10-10 | End: 2024-10-13

## 2024-10-04 NOTE — PROGRESS NOTES
Subjective   Patient ID: Yenny Harvey is a 69 y.o. female.    Patient is here for check in on benzodiazepines.  She has taken them for decades for anxiety.  She has cirrhosis of the liver secondary to primary biliary cholangitis.  She sees hepatology.  She does drink beer at night.  She does not take the medication with beer.  She is on alprazolam.  She is on medication for blood pressure.  UDS and CSA are current    OARRS:  Shelbi Donald MD on 10/13/2024  7:05 PM  I have personally reviewed the OARRS report for Yenny Harvey. I have considered the risks of abuse, dependence, addiction and diversion, I believe that it is clinically appropriate for Yenny Harvey to be prescribed this medication, and I have the following concerns She drinks alcohol    Is the patient prescribed a combination of a benzodiazepine and opioid?  No    Last Urine Drug Screen / ordered today: Yes  Recent Results (from the past 8760 hour(s))   Confirmation Opiate/Opioid/Benzo Prescription Compliance    Collection Time: 05/07/24 11:53 AM   Result Value Ref Range    Clonazepam <25 <25 ng/mL    7-Aminoclonazepam <25 <25 ng/mL    Alprazolam 132 (H) <25 ng/mL    Alpha-Hydroxyalprazolam 122 (H) <25 ng/mL    Midazolam <25 <25 ng/mL    Alpha-Hydroxymidazolam <25 <25 ng/mL    Chlordiazepoxide <25 <25 ng/mL    Diazepam <25 <25 ng/mL    Nordiazepam <25 <25 ng/mL    Temazepam <25 <25 ng/mL    Oxazepam <25 <25 ng/mL    Lorazepam <25 <25 ng/mL    Methadone <25 <25 ng/mL    EDDP <25 <25 ng/mL    6-Acetylmorphine <25 <25 ng/mL    Codeine <50 <50 ng/mL    Hydrocodone <25 <25 ng/mL    Hydromorphone <25 <25 ng/mL    Morphine  <50 <50 ng/mL    Norhydrocodone <25 <25 ng/mL    Noroxycodone <25 <25 ng/mL    Oxycodone <25 <25 ng/mL    Oxymorphone <25 <25 ng/mL    Fentanyl <2.5 <2.5 ng/mL    Norfentanyl <2.5 <2.5 ng/mL    Tramadol <50 <50 ng/mL    O-Desmethyltramadol <50 <50 ng/mL    Zolpidem <25 <25 ng/mL    Zolpidem Metabolite (ZCA) <25 <25 ng/mL   Screen  Opiate/Opioid/Benzo Prescription Compliance    Collection Time: 05/07/24 11:53 AM   Result Value Ref Range    Creatinine, Urine Random 59.9 20.0 - 320.0 mg/dL    Amphetamine Screen, Urine Presumptive Negative Presumptive Negative    Barbiturate Screen, Urine Presumptive Negative Presumptive Negative    Cannabinoid Screen, Urine Presumptive Negative Presumptive Negative    Cocaine Metabolite Screen, Urine Presumptive Negative Presumptive Negative    PCP Screen, Urine Presumptive Negative Presumptive Negative     Results are as expected.         Controlled Substance Agreement:  Date of the Last Agreement: 4/24  Reviewed Controlled Substance Agreement including but not limited to the benefits, risks, and alternatives to treatment with a Controlled Substance medication(s).    Benzodiazepines:  What is the patient's goal of therapy? Less anxiety  Is this being achieved with current treatment? yes    PREMA-7:  Over the last 2 weeks, how often have you been bothered by any of the following problems?  Feeling nervous, anxious, or on edge: 3  Not being able to stop or control worrying: 3  Worrying too much about different things: 3  Trouble relaxing: 3  Being so restless that it is hard to sit still: 3  Becoming easily annoyed or irritable: 3  Feeling afraid as if something awful might happen: 3  PREMA-7 Total Score: 21        Activities of Daily Living:   Is your overall impression that this patient is benefiting (symptom reduction outweighs side effects) from benzodiazepine therapy? Yes     1. Physical Functioning: Better  2. Family Relationship: Better  3. Social Relationship: Better  4. Mood: Better  5. Sleep Patterns: Better  6. Overall Function: Better    Review of Systems   Constitutional:  Negative for fatigue, fever and unexpected weight change.   HENT:  Negative for congestion, ear pain, hearing loss, sore throat and trouble swallowing.    Eyes:  Negative for pain and visual disturbance.   Respiratory:  Negative for  cough and shortness of breath.    Cardiovascular:  Negative for chest pain, palpitations and leg swelling.   Gastrointestinal:  Negative for abdominal pain, blood in stool, diarrhea, nausea and vomiting.   Genitourinary:  Negative for dysuria, frequency, hematuria and urgency.   Musculoskeletal:  Negative for joint swelling.   Skin:  Negative for pallor and rash.   Neurological:  Negative for dizziness, syncope, weakness, numbness and headaches.   Psychiatric/Behavioral:  Negative for confusion, decreased concentration, hallucinations and suicidal ideas. The patient is nervous/anxious.      Vitals:    10/04/24 1400   BP: 138/86   Pulse: 71   Temp: 36.2 °C (97.1 °F)   SpO2: 95%      Objective   Physical Exam  Constitutional:       Appearance: Normal appearance. She is obese.   Cardiovascular:      Rate and Rhythm: Normal rate and regular rhythm.      Heart sounds: Normal heart sounds.   Pulmonary:      Effort: Pulmonary effort is normal.      Breath sounds: Normal breath sounds.   Musculoskeletal:      Cervical back: Neck supple.      Right lower leg: No edema.      Left lower leg: No edema.   Skin:     General: Skin is warm and dry.   Neurological:      General: No focal deficit present.      Mental Status: She is alert.   Psychiatric:         Mood and Affect: Mood normal.         Speech: Speech normal.         Behavior: Behavior normal.         Thought Content: Thought content normal.         Cognition and Memory: Cognition normal.         Assessment/Plan   There are no diagnoses linked to this encounter.

## 2024-10-10 DIAGNOSIS — F41.1 GENERALIZED ANXIETY DISORDER: ICD-10-CM

## 2024-10-13 RX ORDER — ALPRAZOLAM 0.5 MG/1
0.5 TABLET ORAL 3 TIMES DAILY PRN
Qty: 40 TABLET | Refills: 0 | Status: SHIPPED | OUTPATIENT
Start: 2024-10-13

## 2024-10-13 ASSESSMENT — ANXIETY QUESTIONNAIRES
GAD7 TOTAL SCORE: 21
2. NOT BEING ABLE TO STOP OR CONTROL WORRYING: NEARLY EVERY DAY
4. TROUBLE RELAXING: NEARLY EVERY DAY
7. FEELING AFRAID AS IF SOMETHING AWFUL MIGHT HAPPEN: NEARLY EVERY DAY
IF YOU CHECKED OFF ANY PROBLEMS ON THIS QUESTIONNAIRE, HOW DIFFICULT HAVE THESE PROBLEMS MADE IT FOR YOU TO DO YOUR WORK, TAKE CARE OF THINGS AT HOME, OR GET ALONG WITH OTHER PEOPLE: VERY DIFFICULT
3. WORRYING TOO MUCH ABOUT DIFFERENT THINGS: NEARLY EVERY DAY
6. BECOMING EASILY ANNOYED OR IRRITABLE: NEARLY EVERY DAY
5. BEING SO RESTLESS THAT IT IS HARD TO SIT STILL: NEARLY EVERY DAY
1. FEELING NERVOUS, ANXIOUS, OR ON EDGE: NEARLY EVERY DAY

## 2024-10-13 ASSESSMENT — ENCOUNTER SYMPTOMS
TROUBLE SWALLOWING: 0
DIZZINESS: 0
DECREASED CONCENTRATION: 0
HEADACHES: 0
NERVOUS/ANXIOUS: 1
FREQUENCY: 0
VOMITING: 0
HALLUCINATIONS: 0
UNEXPECTED WEIGHT CHANGE: 0
NUMBNESS: 0
DIARRHEA: 0
SHORTNESS OF BREATH: 0
NAUSEA: 0
SORE THROAT: 0
DYSURIA: 0
FATIGUE: 0
ABDOMINAL PAIN: 0
EYE PAIN: 0
BLOOD IN STOOL: 0
WEAKNESS: 0
COUGH: 0
FEVER: 0
JOINT SWELLING: 0
HEMATURIA: 0
PALPITATIONS: 0
CONFUSION: 0

## 2024-10-14 NOTE — PATIENT INSTRUCTIONS
It was nice to see you today!  Discussed current concerns and addressed Advised not to use alcohol with alprazolam.  She is on fluoxetine.  Also advised to quit drinking.  Follow-up with hepatology.  Reviewed medications list  Continue to eat a healthy diet, exercise at least 3 times a week or more  Plan and follow up discussed  For any further information related to your condition, copy and paste or go to [email or web address]

## 2024-11-09 DIAGNOSIS — F41.9 ANXIETY: ICD-10-CM

## 2024-11-11 RX ORDER — FLUOXETINE HYDROCHLORIDE 40 MG/1
40 CAPSULE ORAL DAILY
Qty: 90 CAPSULE | Refills: 1 | Status: SHIPPED | OUTPATIENT
Start: 2024-11-11

## 2024-12-05 DIAGNOSIS — F41.1 GENERALIZED ANXIETY DISORDER: ICD-10-CM

## 2024-12-06 RX ORDER — ALPRAZOLAM 0.5 MG/1
0.5 TABLET ORAL 3 TIMES DAILY PRN
Qty: 40 TABLET | Refills: 0 | OUTPATIENT
Start: 2024-12-06

## 2024-12-10 ENCOUNTER — TELEPHONE (OUTPATIENT)
Dept: GASTROENTEROLOGY | Facility: CLINIC | Age: 69
End: 2024-12-10
Payer: MEDICARE

## 2024-12-10 NOTE — TELEPHONE ENCOUNTER
Hepatology Nurse Coordinator Note  Patient left a voicemail. Unable to understand question, but was regarding imaging and labs. Called patient to get additional information. No answer. Left a voicemail message requesting a call back.

## 2024-12-11 NOTE — TELEPHONE ENCOUNTER
"Hepatology Nurse Coordinator Note   Spoke with patient to follow up on voicemail left. After speaking with patient, determined patient needed clarification on what testing was due, as she \"thought she was told she could wait because of insurance.\" Asked if insurance was still an issue and she said no.     Patient is aware Dr. Torres recommends the following:  -Blood work, which is due.  -Liver Ultrasound with Elastography, which is due.  -Abstain from alcohol.     Patient is aware these are the recommendations from Dr. Torres. She's aware she would need to contact her insurance company direct regarding coverage if any questions.   Patient also endorsed she is still drinking alcohol. She states she is drinking \"6 beers a day.\" Emphasized the importance of abstaining from alcohol. Offered patient resources for alcohol cessation programs (ie IOP, AA, etc.). Patient was not receptive and is refusing resources at this time.     Patient is aware she should keep her scheduled appointment with Dr. Torres as well.  Patient verbalized understanding of recommendations. Will provide patient update to Dr. Torres.   "

## 2024-12-20 NOTE — TELEPHONE ENCOUNTER
Patient requesting fluoxetine 40mg capsule. Looks discontinued. Pt states never was discussed to stop medication. [facility] we fill [facility]      States never requested alprazolam back in November. Still has medication

## 2024-12-23 DIAGNOSIS — F41.9 ANXIETY: ICD-10-CM

## 2024-12-23 RX ORDER — FLUOXETINE HYDROCHLORIDE 40 MG/1
40 CAPSULE ORAL DAILY
Qty: 90 CAPSULE | Refills: 3 | Status: SHIPPED | OUTPATIENT
Start: 2024-12-23

## 2024-12-23 RX ORDER — FLUOXETINE HYDROCHLORIDE 40 MG/1
40 CAPSULE ORAL DAILY
Qty: 90 CAPSULE | Refills: 3 | Status: SHIPPED | OUTPATIENT
Start: 2024-12-23 | End: 2024-12-23 | Stop reason: SDUPTHER

## 2025-01-02 DIAGNOSIS — I10 PRIMARY HYPERTENSION: ICD-10-CM

## 2025-01-02 DIAGNOSIS — E03.9 ACQUIRED HYPOTHYROIDISM: ICD-10-CM

## 2025-01-02 DIAGNOSIS — F41.1 GENERALIZED ANXIETY DISORDER: ICD-10-CM

## 2025-01-02 DIAGNOSIS — F41.9 ANXIETY: ICD-10-CM

## 2025-01-03 DIAGNOSIS — I10 PRIMARY HYPERTENSION: ICD-10-CM

## 2025-01-03 RX ORDER — ALPRAZOLAM 0.5 MG/1
0.5 TABLET ORAL 3 TIMES DAILY PRN
Qty: 40 TABLET | Refills: 1 | OUTPATIENT
Start: 2025-01-03

## 2025-01-03 RX ORDER — AMLODIPINE BESYLATE 10 MG
10 TABLET ORAL DAILY
Qty: 90 TABLET | Refills: 1 | OUTPATIENT
Start: 2025-01-03

## 2025-01-03 RX ORDER — URSODIOL 500 MG/1
500 TABLET, FILM COATED ORAL 2 TIMES DAILY
Qty: 180 TABLET | Refills: 1 | Status: SHIPPED | OUTPATIENT
Start: 2025-01-03

## 2025-01-03 RX ORDER — AMLODIPINE BESYLATE 10 MG/1
10 TABLET ORAL DAILY
Qty: 90 TABLET | Refills: 3 | Status: SHIPPED | OUTPATIENT
Start: 2025-01-03

## 2025-02-03 DIAGNOSIS — F41.1 GENERALIZED ANXIETY DISORDER: ICD-10-CM

## 2025-02-03 DIAGNOSIS — I10 PRIMARY HYPERTENSION: ICD-10-CM

## 2025-02-03 DIAGNOSIS — E03.9 ACQUIRED HYPOTHYROIDISM: ICD-10-CM

## 2025-02-03 DIAGNOSIS — F41.9 ANXIETY: ICD-10-CM

## 2025-02-04 RX ORDER — LISINOPRIL 40 MG/1
40 TABLET ORAL DAILY
Qty: 90 TABLET | Refills: 1 | Status: SHIPPED | OUTPATIENT
Start: 2025-02-04

## 2025-02-11 LAB
ALBUMIN SERPL-MCNC: 4.5 G/DL (ref 3.6–5.1)
ALBUMIN/GLOB SERPL: 1.6 (CALC) (ref 1–2.5)
ALP SERPL-CCNC: 108 U/L (ref 37–153)
ALT SERPL-CCNC: 62 U/L (ref 6–29)
AST SERPL-CCNC: 59 U/L (ref 10–35)
BILIRUB DIRECT SERPL-MCNC: 0.3 MG/DL
BILIRUB INDIRECT SERPL-MCNC: 0.7 MG/DL (CALC) (ref 0.2–1.2)
BILIRUB SERPL-MCNC: 1 MG/DL (ref 0.2–1.2)
GLOBULIN SER CALC-MCNC: 2.8 G/DL (CALC) (ref 1.9–3.7)
IGM SERPL-MCNC: 268 MG/DL (ref 50–300)
PROT SERPL-MCNC: 7.3 G/DL (ref 6.1–8.1)

## 2025-02-28 ENCOUNTER — APPOINTMENT (OUTPATIENT)
Dept: GASTROENTEROLOGY | Facility: CLINIC | Age: 70
End: 2025-02-28
Payer: MEDICARE

## 2025-03-01 DIAGNOSIS — E03.9 ACQUIRED HYPOTHYROIDISM: ICD-10-CM

## 2025-03-01 DIAGNOSIS — F41.1 GENERALIZED ANXIETY DISORDER: ICD-10-CM

## 2025-03-01 DIAGNOSIS — I10 PRIMARY HYPERTENSION: ICD-10-CM

## 2025-03-01 DIAGNOSIS — F41.9 ANXIETY: ICD-10-CM

## 2025-03-03 RX ORDER — DOXAZOSIN 4 MG/1
4 TABLET ORAL DAILY
Qty: 90 TABLET | Refills: 1 | Status: SHIPPED | OUTPATIENT
Start: 2025-03-03

## 2025-03-03 RX ORDER — PANTOPRAZOLE SODIUM 40 MG/1
TABLET, DELAYED RELEASE ORAL
Qty: 90 TABLET | Refills: 1 | Status: SHIPPED | OUTPATIENT
Start: 2025-03-03

## 2025-03-04 NOTE — PROGRESS NOTES
Hepatology: Follow up Office Note      Patient: Yenny Harvey, a 69 y.o. year old female presents for a follow up visit for PBC, alcohol/MetALD related liver ds.   PCP: Shlebi Donald MD     History of Present Illness   PMHx: HTN, CAD, anxiety, alcohol use.      Pertinent hx: Ms Harvey presented for an initial evaluation in Aug 2021 of long standing hx of elevated liver enzymes, for 4-5 yrs. Significant hx of alcohol use and noted using this to deal with her anxiety. Symptoms of loose stools for the last 1-2 yrs, with urgency. Usually small volume stools without blood or mucous. Denied abdominal pain, weight loss, and change in appetite.   Underwent labs and imaging after her visit in Aug 2021. Was noted to have significant elevations in AMA 1:1280, CHEO 1:160 without IgG or ASMA abnormalities. In addition, had an elevated ferritin and TS level. She underwent an ultrasound of the liver which demonstrated cirrhotic liver morphology without features suggestive of portal HTN. Discussed these findings with the pt- diagnosis of PBC was discussed. Possibility of an overlap syndrome could not be excluded. Pt was reluctant to undergo and liver bx for evaluation and wanted to proceed with therapy for PBC. Ursodiol was started 500mg BID dosing. Labs had downtrended from prior elevations.   She has continued to have alcohol use in the setting of chronic liver ds and PBC. Liver enzymes have fluctuated. She has continued to decline a liver bx for evaluation.   Last visit was in May 2023. Pt cancelled visit in April 2024 and presented for a follow up in August 2024.     Today's office visit: She presents for a follow up today. Had labs done in Feb. She notes compliance with ursodiol. Continues to consume alcohol on a daily basis. Noted use of 28 cans of beer in a week. Denies acute symptoms. Notes that she continued to struggle with anxiety and alcohol use helps with this. In addition notes some financial constraints with  undergoing ultrasound and elastography. She is happy to see how liver enzymes are on blood work.      Review of Systems   Constitutional/ General: No fever   Eyes: no yellow discoloration  ENT: normal   Cardiovascular: no chest pain, no palpitations  Respiratory: no shortness of breath  Gastrointestinal: denies abdominal pain, nausea, vomiting  Integumentary: no rashes, no yellow discoloration of skin  Neurologic: no weakness or numbness of extremities  Psychiatric: anxiety  Genitourinary: no hematuria     All other systems have been reviewed and are negative except as noted in the HPI and above.     Meds: Noted and reviewed. No herbal supplements. Ursodiol 500mg bid.   Social hx: Continues to use alcohol regularly, drinks beer- noted use of 28 drinks a week. Denies use of drugs or illicit substances, hx of smoking +  Family hx: no hx of liver ds, liver tumors or bile duct tumors in the family  Surgical hx: no intra-abdominal surgeries     Medications     Current Outpatient Medications   Medication Instructions    ALPRAZolam (XANAX) 0.5 mg, oral, 3 times daily PRN    amLODIPine (NORVASC) 10 mg, oral, Daily    Cardura 4 mg, oral, Daily    FLUoxetine (PROZAC) 40 mg, oral, Daily    lisinopril 40 mg, oral, Daily    metoprolol tartrate (LOPRESSOR) 50 mg, oral, 2 times daily    nitroglycerin (NITROSTAT) 0.4 mg, sublingual, Every 5 min PRN    Protonix 40 mg EC tablet TAKE 1 TABLET (40 MG) BY MOUTH ONCE A DAY IN THE MORNING BEFORE A MEAL    ursodiol (ACTIGALL) 500 mg, oral, 2 times daily      Physical Examination      Vitals:    03/07/25 1547   BP: 177/72   Pulse: 72   Resp: 18   SpO2: 95%     Vitals:    03/07/25 1547   Weight: 77.5 kg (170 lb 14.4 oz)     Body mass index is 33.38 kg/m².    Constitutional: awake, alert   Eyes: EOMI, anicteric sclera  Respiratory: Bilateral air entry equal   Cardiovascular: regular rate and rhythm, no lower extremity edema  Abdomen: soft, non tender, increased abdominal adiposity, bowel  sounds present  Psychiatric: alert, oriented to time/place/person     Labs      Lab Results   Component Value Date    ALKPHOS 108 02/10/2025    ALKPHOS 145 (H) 07/30/2024    ALKPHOS 117 04/09/2024    ALKPHOS 127 12/13/2023    ALT 62 (H) 02/10/2025    ALT 74 (H) 07/30/2024    ALT 64 (H) 04/09/2024    ALT 58 (H) 12/13/2023    AST 59 (H) 02/10/2025    AST 60 (H) 07/30/2024    AST 54 (H) 04/09/2024    AST 45 (H) 12/13/2023    BILITOT 1.0 02/10/2025    BILITOT 0.8 07/30/2024    BILITOT 0.9 04/09/2024    BILITOT 0.8 12/13/2023    GGT 2,594 (H) 08/13/2021    WBC 7.1 04/09/2024     04/09/2024    HGB 15.0 04/09/2024    CREATININE 0.92 04/09/2024     04/09/2024    AFP 9 12/13/2023     Lab Results   Component Value Date    SAID416 464 11/02/2022    XIBN908 303 11/02/2022 Feb 2025: IgM 268  May 2023: Hb 15.1, Plt 152, , , AST 75, Bili 1, AFP 10  Feb 2023: AFP 11, ALT 69, AST 47, , Bili 1.0  Nov 2022: Hb 14.6, Plt 199, WBC 6.9, INR 0.9, Na 137, creat 1.04, , ALT 91, AST 68, Bili 0.8, PETH 464/303  August 2022: WBC 6.6, hemoglobin 15.2, platelets 207, , ALT 86, AST 71, bilirubin 0.9, sodium 136, creatinine 1.05, INR 0.9  March 2022: INR 1, hemoglobin 14.3, platelets 118, , A LT 48, AST 47, bilirubin 0.9  November 2021: AFP 8  Sept 2021: , AST 32, ALT 33, Bili 1, INR 0.9, Na 136, Creat 0.84, normal CBC, ferritin 626, TS 69, Hemochromatosis normal gene  August 2021: , AST 83, ALT 94, Bili 1.3, AMA 1: 1280, HBV sAb 84.8, sAg/core total Ab NR, HCV Ab NR, HAV total reactive, normal ceruloplasmin, CHEO 1:160, Neg ASMA, IgG 1070, normal A1AT, normal ceruloplasmin  June 2021: , , AST 94, Bili 1.2. June 2020: , ALT 69, AST 52.   2017- , 2019-  (Records from [facility])     Imaging   US elastography liver Oct 2023: IMPRESSION: Median liver stiffness 1.5 m/sec.  US liver April 2023: Liver is enlarged- heterogenous, hyperechoic hepatic  parenchyma- combination of steatosis and/or chronic hepatocellular ds. Nothing suspicious for HCC.   US liver Oct 2022: Hepatomegaly and steatosis. Median liver stiffness- 1.65m/sec, 8.34 kPa. (f2)  Ultrasound liver March 2022: Diffuse fatty infiltration of the liver, mild hepatomegaly, no discrete hepatic lesion.  Ultrasound and elastography November 2021: Median liver stiffness 6.45K PA/1.44m/sec.   US liver: Aug 2021: Cirrhotic morphology with no definite sonographic evidence of portal HTN. No focal hepatic lesions.      Assessment and Plan    Yenny Harvey, a 69 y.o. year old female presents for a follow up visit for PBC and alcohol/MetALD use related liver ds. I have reviewed pertinent provider notes, labs and imaging studies. Discussed results and their interpretation with the patient today.     Encounter Diagnoses   Name Primary?    Primary biliary cholangitis (Multi) Yes    Elevated liver enzymes     Chronic alcohol use      Orders Placed This Encounter   Procedures    US abdomen limited liver    US elastography parenchyma EG organ    Basic Metabolic Panel    CBC and Auto Differential    Hepatic Function Panel    Protime-INR    [facility] MISCELLANEOUS TEST (REFRIGERATED)      # AMA positive PBC and component of AUD/MetALD related liver disease  Initial presentation with significant elevation (5-6 x ULN) of ALP and positive AMA titers: she has been diagnosed with Primary Biliary Cholangitis.   In addition to AMA, she is also noted to have a significant elevation in CHEO, ferritin and TS without HFE mutation.  Liver enzymes had shown a good response with downtrend in ALP and had near normalization of AST and ALT after initiation of ursodiol. Initial US liver showed evidence of cirrhotic morphology of the liver and subsequently US Liver and elastography without evidence of cirrhosis or advanced fibrosis.        She has had elevation of ALT and AST which have been ongoing since Feb 2022. These have fluctuated.  Whether this is related to a secondary etiology of liver injury- such as alcohol, metabolic fatty liver ds vs autoimmune overlap with PBC is considered.   Pt has refused a liver bx for evaluation.   Discussed again the detrimental effects of any alcohol use- given chronic liver ds hx, elevated LFTs and concern for liver fibrosis. In the past pt has deferred pharmacotherapy for alcohol use. I have also reviewed with the pt that in case of progression of liver ds, development of cirrhosis/decompensated liver ds- she would not be a candidate for liver txp eval due to her hx of chronic alcohol use in the setting of liver disease. She verbalized understanding.     Labs recently with normalization of ALP, intermittent fluctuations noted. Mild ALT and AST elevations noted. Has been due for US liver and elastography.      -Continue ursodiol at 500mg bid dosing and intermittent lab monitoring every 6 months.   -Goal for liver enzyme normalization- patient continues to have persistent elevated liver enzymes with optimal ursodiol dosing. As noted above, she has refused a liver bx.   -Recommend US liver with elastography this year. This has been due.   -Recommend check of ELF with next set of labs in 6 months with LFTs.   -She has evidence of immunity to HAV and HBV  - Reinforced recommendation for cessation of alcohol use, given presence of PBC and MetALD/AUD related liver ds.     Follow up in 6 months.

## 2025-03-07 ENCOUNTER — APPOINTMENT (OUTPATIENT)
Dept: GASTROENTEROLOGY | Facility: CLINIC | Age: 70
End: 2025-03-07
Payer: MEDICARE

## 2025-03-07 VITALS
RESPIRATION RATE: 18 BRPM | BODY MASS INDEX: 33.55 KG/M2 | DIASTOLIC BLOOD PRESSURE: 72 MMHG | WEIGHT: 170.9 LBS | SYSTOLIC BLOOD PRESSURE: 177 MMHG | OXYGEN SATURATION: 95 % | HEART RATE: 72 BPM | HEIGHT: 60 IN

## 2025-03-07 DIAGNOSIS — K74.3 PRIMARY BILIARY CHOLANGITIS (MULTI): Primary | ICD-10-CM

## 2025-03-07 DIAGNOSIS — F10.90 CHRONIC ALCOHOL USE: ICD-10-CM

## 2025-03-07 DIAGNOSIS — R74.8 ELEVATED LIVER ENZYMES: ICD-10-CM

## 2025-03-07 PROCEDURE — 3077F SYST BP >= 140 MM HG: CPT | Performed by: STUDENT IN AN ORGANIZED HEALTH CARE EDUCATION/TRAINING PROGRAM

## 2025-03-07 PROCEDURE — 99213 OFFICE O/P EST LOW 20 MIN: CPT | Performed by: STUDENT IN AN ORGANIZED HEALTH CARE EDUCATION/TRAINING PROGRAM

## 2025-03-07 PROCEDURE — 3078F DIAST BP <80 MM HG: CPT | Performed by: STUDENT IN AN ORGANIZED HEALTH CARE EDUCATION/TRAINING PROGRAM

## 2025-03-07 PROCEDURE — 1159F MED LIST DOCD IN RCRD: CPT | Performed by: STUDENT IN AN ORGANIZED HEALTH CARE EDUCATION/TRAINING PROGRAM

## 2025-03-07 PROCEDURE — 1126F AMNT PAIN NOTED NONE PRSNT: CPT | Performed by: STUDENT IN AN ORGANIZED HEALTH CARE EDUCATION/TRAINING PROGRAM

## 2025-03-07 PROCEDURE — 1123F ACP DISCUSS/DSCN MKR DOCD: CPT | Performed by: STUDENT IN AN ORGANIZED HEALTH CARE EDUCATION/TRAINING PROGRAM

## 2025-03-07 PROCEDURE — 3008F BODY MASS INDEX DOCD: CPT | Performed by: STUDENT IN AN ORGANIZED HEALTH CARE EDUCATION/TRAINING PROGRAM

## 2025-03-07 ASSESSMENT — PAIN SCALES - GENERAL: PAINLEVEL_OUTOF10: 0-NO PAIN

## 2025-03-07 NOTE — PATIENT INSTRUCTIONS
Yenny Harvey,     Thank you for coming in for your hepatology office visit today. As per our discussion, I recommend:     Have labs done in 6 months.   Have an ultrasound of the liver and elastography, call [phone].   Continue ursodiol.   I recommend stop use of alcohol. With PBC, it can increase the risk of liver related complications and risk of advancement in liver disease.     I would like to see you back in the office in 6 months.   If you are not provided with a date for your follow up visit at the end of your encounter today at the check out desk, I would encourage you to call [phone] to schedule your appointment with me.   If you have any trouble or need assistance with having this done, please reach out to my 's office at [phone] (Ms Carley Solis) or my nurse coordinator at [phone] (Ms Onelia LÓPEZ).     I look forward to seeing you again. Thank you for allowing me to participate in your care.     Sincerely,  Rowena Torres MD  Hepatology

## 2025-04-10 PROBLEM — K21.9 GASTROESOPHAGEAL REFLUX DISEASE: Status: ACTIVE | Noted: 2025-04-10

## 2025-04-10 PROBLEM — R06.09 DYSPNEA ON EXERTION: Status: ACTIVE | Noted: 2025-04-10

## 2025-04-10 PROBLEM — M67.912: Status: ACTIVE | Noted: 2025-04-10

## 2025-04-10 PROBLEM — M25.519 SHOULDER PAIN: Status: ACTIVE | Noted: 2025-04-10

## 2025-04-10 PROBLEM — M67.911: Status: ACTIVE | Noted: 2025-04-10

## 2025-04-11 ENCOUNTER — APPOINTMENT (OUTPATIENT)
Dept: PRIMARY CARE | Facility: CLINIC | Age: 70
End: 2025-04-11
Payer: MEDICARE

## 2025-04-25 ENCOUNTER — TELEPHONE (OUTPATIENT)
Dept: PRIMARY CARE | Facility: CLINIC | Age: 70
End: 2025-04-25
Payer: MEDICARE

## 2025-04-28 ENCOUNTER — TELEPHONE (OUTPATIENT)
Dept: PRIMARY CARE | Facility: CLINIC | Age: 70
End: 2025-04-28
Payer: MEDICARE

## 2025-04-28 DIAGNOSIS — F41.1 GENERALIZED ANXIETY DISORDER: ICD-10-CM

## 2025-04-28 RX ORDER — ALPRAZOLAM 0.5 MG/1
TABLET ORAL
Qty: 40 TABLET | Refills: 0 | Status: SHIPPED | OUTPATIENT
Start: 2025-04-28

## 2025-04-28 NOTE — TELEPHONE ENCOUNTER
Pt had to r/s appt due to provider availability but needs a refill for alprazolam she only has a few left, needs it called in to mail order pharmacy.

## 2025-05-28 ENCOUNTER — APPOINTMENT (OUTPATIENT)
Dept: PRIMARY CARE | Facility: CLINIC | Age: 70
End: 2025-05-28
Payer: MEDICARE

## 2025-05-28 VITALS
BODY MASS INDEX: 32.42 KG/M2 | OXYGEN SATURATION: 95 % | HEART RATE: 74 BPM | SYSTOLIC BLOOD PRESSURE: 130 MMHG | DIASTOLIC BLOOD PRESSURE: 70 MMHG | WEIGHT: 166 LBS | TEMPERATURE: 96.8 F

## 2025-05-28 DIAGNOSIS — K76.0 STEATOSIS OF LIVER: Primary | ICD-10-CM

## 2025-05-28 DIAGNOSIS — E55.9 VITAMIN D DEFICIENCY: ICD-10-CM

## 2025-05-28 DIAGNOSIS — F10.90 CHRONIC ALCOHOL USE: ICD-10-CM

## 2025-05-28 DIAGNOSIS — K74.69 OTHER CIRRHOSIS OF LIVER: ICD-10-CM

## 2025-05-28 DIAGNOSIS — Z12.11 COLON CANCER SCREENING: ICD-10-CM

## 2025-05-28 DIAGNOSIS — K21.9 GASTROESOPHAGEAL REFLUX DISEASE WITHOUT ESOPHAGITIS: ICD-10-CM

## 2025-05-28 DIAGNOSIS — Z79.899 MEDICATION MANAGEMENT: ICD-10-CM

## 2025-05-28 DIAGNOSIS — I10 PRIMARY HYPERTENSION: ICD-10-CM

## 2025-05-28 DIAGNOSIS — Z12.31 BREAST CANCER SCREENING BY MAMMOGRAM: ICD-10-CM

## 2025-05-28 DIAGNOSIS — E78.2 MIXED HYPERLIPIDEMIA: ICD-10-CM

## 2025-05-28 DIAGNOSIS — F17.210 CIGARETTE NICOTINE DEPENDENCE WITHOUT COMPLICATION: ICD-10-CM

## 2025-05-28 PROCEDURE — 1126F AMNT PAIN NOTED NONE PRSNT: CPT | Performed by: FAMILY MEDICINE

## 2025-05-28 PROCEDURE — 3078F DIAST BP <80 MM HG: CPT | Performed by: FAMILY MEDICINE

## 2025-05-28 PROCEDURE — 1160F RVW MEDS BY RX/DR IN RCRD: CPT | Performed by: FAMILY MEDICINE

## 2025-05-28 PROCEDURE — 3075F SYST BP GE 130 - 139MM HG: CPT | Performed by: FAMILY MEDICINE

## 2025-05-28 PROCEDURE — G0439 PPPS, SUBSEQ VISIT: HCPCS | Performed by: FAMILY MEDICINE

## 2025-05-28 PROCEDURE — 1158F ADVNC CARE PLAN TLK DOCD: CPT | Performed by: FAMILY MEDICINE

## 2025-05-28 PROCEDURE — 1170F FXNL STATUS ASSESSED: CPT | Performed by: FAMILY MEDICINE

## 2025-05-28 PROCEDURE — 1159F MED LIST DOCD IN RCRD: CPT | Performed by: FAMILY MEDICINE

## 2025-05-28 ASSESSMENT — ANXIETY QUESTIONNAIRES
5. BEING SO RESTLESS THAT IT IS HARD TO SIT STILL: MORE THAN HALF THE DAYS
1. FEELING NERVOUS, ANXIOUS, OR ON EDGE: NEARLY EVERY DAY
7. FEELING AFRAID AS IF SOMETHING AWFUL MIGHT HAPPEN: SEVERAL DAYS
3. WORRYING TOO MUCH ABOUT DIFFERENT THINGS: NEARLY EVERY DAY
IF YOU CHECKED OFF ANY PROBLEMS ON THIS QUESTIONNAIRE, HOW DIFFICULT HAVE THESE PROBLEMS MADE IT FOR YOU TO DO YOUR WORK, TAKE CARE OF THINGS AT HOME, OR GET ALONG WITH OTHER PEOPLE: VERY DIFFICULT
4. TROUBLE RELAXING: NEARLY EVERY DAY
2. NOT BEING ABLE TO STOP OR CONTROL WORRYING: NEARLY EVERY DAY
6. BECOMING EASILY ANNOYED OR IRRITABLE: SEVERAL DAYS
GAD7 TOTAL SCORE: 16

## 2025-05-28 ASSESSMENT — PATIENT HEALTH QUESTIONNAIRE - PHQ9
2. FEELING DOWN, DEPRESSED OR HOPELESS: NEARLY EVERY DAY
SUM OF ALL RESPONSES TO PHQ9 QUESTIONS 1 AND 2: 6
1. LITTLE INTEREST OR PLEASURE IN DOING THINGS: NEARLY EVERY DAY

## 2025-05-28 ASSESSMENT — ACTIVITIES OF DAILY LIVING (ADL)
GROCERY_SHOPPING: INDEPENDENT
DRESSING: INDEPENDENT
MANAGING_FINANCES: INDEPENDENT
TAKING_MEDICATION: INDEPENDENT
DOING_HOUSEWORK: INDEPENDENT
BATHING: INDEPENDENT

## 2025-05-28 ASSESSMENT — ENCOUNTER SYMPTOMS
LIGHT-HEADEDNESS: 0
DYSPHORIC MOOD: 0
NERVOUS/ANXIOUS: 1
JOINT SWELLING: 0
SINUS PAIN: 0
PALPITATIONS: 0
TROUBLE SWALLOWING: 0
VOMITING: 0
HEMATURIA: 0
BLOOD IN STOOL: 0
WEAKNESS: 0
BRUISES/BLEEDS EASILY: 0
FATIGUE: 0
DIARRHEA: 0
SHORTNESS OF BREATH: 0
NAUSEA: 0
DYSURIA: 0
UNEXPECTED WEIGHT CHANGE: 0
ABDOMINAL PAIN: 0
TREMORS: 0
COUGH: 0
FEVER: 0

## 2025-05-28 ASSESSMENT — PAIN SCALES - GENERAL: PAINLEVEL_OUTOF10: 0-NO PAIN

## 2025-05-28 NOTE — PATIENT INSTRUCTIONS
It was nice to see you today!  Discussed current concerns and addressed   Reviewed recent labs and diagnostics  Reviewed medications list  Continue to eat a healthy diet, exercise at least 3 times a week or more  Plan and follow up discussed  For any further information related to your condition, copy and paste or go to [email or web address]  Patient declines vaccines, CT chest, agreed to receive another cologuard.  Seems to understand risks  Needs alcohol cessation, patient has declined    RF benzodiazepine  Appropriate guidelines followed for filling controlled substance  Please implement behavioral changes as well  Do not share medication  Please note that though marijuana is now legal recreationally in [facility] policy does not allow me to prescribe any controlled substances to those who use or have marijuana in their system  Follow up 6 months

## 2025-05-28 NOTE — PROGRESS NOTES
Subjective   Reason for Visit: Yenny Harvey is an 70 y.o. female here for a Medicare Wellness visit.          Reviewed all medications by prescribing practitioner or clinical pharmacist (such as prescriptions, OTCs, herbal therapies and supplements) and documented in the medical record.  OARRS:  No data recorded  I have personally reviewed the OARRS report for Yenny Harvey. I have considered the risks of abuse, dependence, addiction and diversion, I believe that it is clinically appropriate for Yenny Harvey to be prescribed this medication, and I have the following concerns alcohol use    Is the patient prescribed a combination of a benzodiazepine and opioid?  No    Last Urine Drug Screen / ordered today: Yes  No results found for this or any previous visit (from the past 8760 hours).  Results are as expected.         Controlled Substance Agreement:  Date of the Last Agreement: today  Reviewed Controlled Substance Agreement including but not limited to the benefits, risks, and alternatives to treatment with a Controlled Substance medication(s).    Benzodiazepines:  What is the patient's goal of therapy? Less anxiety  Is this being achieved with current treatment? yes    PREMA-7:  Over the last 2 weeks, how often have you been bothered by any of the following problems?  Feeling nervous, anxious, or on edge: 3  Not being able to stop or control worrying: 3  Worrying too much about different things: 3  Trouble relaxing: 3  Being so restless that it is hard to sit still: 2  Becoming easily annoyed or irritable: 1  Feeling afraid as if something awful might happen: 1  PREMA-7 Total Score: 16        Activities of Daily Living:   Is your overall impression that this patient is benefiting (symptom reduction outweighs side effects) from benzodiazepine therapy? Yes     1. Physical Functioning: Same  2. Family Relationship: Better  3. Social Relationship: Better  4. Mood: Better  5. Sleep Patterns: Better  6. Overall  Function: Better    Yenny Harvey comes in today for medicare wellness.  There has been no chest pain, shortness of breath, fever, chills, unexplained weight loss, rectal bleeding or any other unusual symptoms. Has cirrhosis and sees liver specialist.  Body mass index is 32.42 kg/m². Most recent labs, diagnostics and pertinent information has been reviewed. Will update if necessary.  Patient is attempting to eat a healthy diet and incorporate exercise in to their lifestyle. Patient smokes. Declines CT chest. Understands it is screen for cancer. Sent cologuard. It . Patient is practicing routine eye and dental care. Reviewed employment status. Reviewed current medications, if any. Immunizations reviewed and updated if appropriate.  Patient also seen for benzodiazepine refills. Has been on for years. CSA and UDS are current/updated. There are no problems with medication. Patient is aware not to drive, use heavy machinery, drink alcohol or take other sedatives while on medication. She has a hx of alcohol abuse but does not drink on medication. Anxiety is well controlled on medication. Other modalities have been tried to control anxiety.    Last Labs:     CMP: Lab Results       Component                Value               Date                       CALCIUM                  9.4                 2024                 CALCIUM                  9.3                 2023                 PROT                     7.3                 02/10/2025                 PROT                     7.0                 2024                 PROT                     7.0                 2024                 ALBUMIN                  4.5                 02/10/2025                 ALBUMIN                  4.2                 2024                 ALBUMIN                  4.2                 2024                 AST                      59 (H)              02/10/2025                 AST                      60  (H)              07/30/2024                 AST                      54 (H)              04/09/2024                 ALKPHOS                  108                 02/10/2025                 ALKPHOS                  145 (H)             07/30/2024                 ALKPHOS                  117                 04/09/2024                 BILITOT                  1.0                 02/10/2025                 BILITOT                  0.8                 07/30/2024                 BILITOT                  0.9                 04/09/2024            CBC:   Lab Results       Component                Value               Date                       WBC                      7.1                 04/09/2024                 WBC                      7.9                 12/13/2023                 HGB                      15.0                04/09/2024                 HGB                      15.8                12/13/2023                 HCT                      42.6                04/09/2024                 HCT                      44.2                12/13/2023                 MCV                      104 (H)             04/09/2024                 MCV                      102 (H)             12/13/2023                 PLT                      213                 04/09/2024                 PLT                      223                 12/13/2023            A1C:   Lab Results       Component                Value               Date                       HGBA1C                   4.9                 06/15/2021                 HGBA1C                   5.2                 06/01/2020            LIPID PANEL:   Lab Results       Component                Value               Date                       CHOL                     220 (H)             06/01/2020                 TRIG                     148                 06/01/2020                 HDL                      53.3                06/01/2020                 CHHDL                    4.1           "       06/01/2020                 LDLF                     137 (H)             06/01/2020                 VLDL                     30                  06/01/2020            TSH:   Lab Results       Component                Value               Date                       TSH                      3.14                08/30/2023                 TSH                      4.15 (H)            11/02/2022            PSA:   No results found for: \"PSA\"           Patient Care Team:  Shelbi Donald MD as PCP - General  Shelbi Donald MD as PCP - [employer] PCP     Review of Systems   Constitutional:  Negative for fatigue, fever and unexpected weight change.   HENT:  Negative for congestion, ear pain, nosebleeds, sinus pain and trouble swallowing.    Eyes:  Negative for visual disturbance.   Respiratory:  Negative for cough and shortness of breath.    Cardiovascular:  Negative for chest pain and palpitations.   Gastrointestinal:  Negative for abdominal pain, blood in stool, diarrhea, nausea and vomiting.   Genitourinary:  Negative for dysuria and hematuria.   Musculoskeletal:  Negative for gait problem and joint swelling.   Neurological:  Negative for tremors, weakness and light-headedness.   Hematological:  Does not bruise/bleed easily.   Psychiatric/Behavioral:  Negative for dysphoric mood and suicidal ideas. The patient is nervous/anxious.        Objective   Vitals:  /70   Pulse 74   Temp 36 °C (96.8 °F)   Wt 75.3 kg (166 lb)   SpO2 95%   BMI 32.42 kg/m²       Physical Exam  Constitutional:       Appearance: Normal appearance. She is obese.   HENT:      Head: Normocephalic and atraumatic.      Right Ear: Tympanic membrane and external ear normal.      Left Ear: Tympanic membrane and external ear normal.      Nose: Nose normal.      Mouth/Throat:      Mouth: Mucous membranes are moist.      Pharynx: Oropharynx is clear. No oropharyngeal exudate.   Eyes:      Extraocular Movements: Extraocular " movements intact.      Conjunctiva/sclera: Conjunctivae normal.      Pupils: Pupils are equal, round, and reactive to light.   Cardiovascular:      Rate and Rhythm: Normal rate and regular rhythm.      Heart sounds: Normal heart sounds.   Pulmonary:      Effort: Pulmonary effort is normal.      Breath sounds: Normal breath sounds.   Abdominal:      General: Abdomen is flat.      Palpations: Abdomen is soft. There is no mass.      Tenderness: There is no abdominal tenderness. There is no guarding.   Musculoskeletal:      Cervical back: Neck supple.   Lymphadenopathy:      Cervical: No cervical adenopathy.   Skin:     General: Skin is warm and dry.   Neurological:      General: No focal deficit present.      Mental Status: She is alert and oriented to person, place, and time.      Cranial Nerves: No cranial nerve deficit.      Motor: No weakness.      Comments: Occasional vertigo with turning head too quick.   Psychiatric:         Mood and Affect: Mood normal.         Speech: Speech normal.         Behavior: Behavior normal.         Cognition and Memory: Cognition normal.         Assessment & Plan  Steatosis of liver         Primary hypertension    Orders:    Urinalysis with Reflex Microscopic; Future    Chronic alcohol use         Gastroesophageal reflux disease without esophagitis         Mixed hyperlipidemia    Orders:    Lipid Panel; Future    Cigarette nicotine dependence without complication         Vitamin D deficiency    Orders:    Vitamin D 25-Hydroxy,Total (for eval of Vitamin D levels); Future    Breast cancer screening by mammogram    Orders:    BI mammo bilateral screening tomosynthesis; Future    Colon cancer screening    Orders:    Cologuard® colon cancer screening; Future    Medication management    Orders:    Opiate/Opioid/Benzo Prescription Compliance

## 2025-05-31 LAB
1OH-MIDAZOLAM UR-MCNC: NEGATIVE NG/ML
7AMINOCLONAZEPAM UR-MCNC: NEGATIVE NG/ML
A-OH ALPRAZ UR-MCNC: 235 NG/ML
A-OH-TRIAZOLAM UR-MCNC: NEGATIVE NG/ML
AMPHETAMINES UR QL: NEGATIVE NG/ML
BARBITURATES UR QL: NEGATIVE NG/ML
BZE UR QL: NEGATIVE NG/ML
CODEINE UR-MCNC: NEGATIVE NG/ML
CREAT UR-MCNC: 82.6 MG/DL
DRUG SCREEN COMMENT UR-IMP: ABNORMAL
EDDP UR-MCNC: NEGATIVE NG/ML
FENTANYL UR-MCNC: ABNORMAL NG/ML
HYDROCODONE UR-MCNC: NEGATIVE NG/ML
HYDROMORPHONE UR-MCNC: NEGATIVE NG/ML
LORAZEPAM UR-MCNC: NEGATIVE NG/ML
METHADONE UR-MCNC: NEGATIVE NG/ML
MORPHINE UR-MCNC: NEGATIVE NG/ML
NORDIAZEPAM UR-MCNC: NEGATIVE NG/ML
NORFENTANYL UR-MCNC: ABNORMAL NG/ML
NORHYDROCODONE UR CFM-MCNC: NEGATIVE NG/ML
NOROXYCODONE UR CFM-MCNC: NEGATIVE NG/ML
NORTRAMADOL UR-MCNC: NEGATIVE NG/ML
OH-ETHYLFLURAZ UR-MCNC: NEGATIVE NG/ML
OXAZEPAM UR-MCNC: NEGATIVE NG/ML
OXIDANTS UR QL: NEGATIVE MCG/ML
OXYCODONE UR CFM-MCNC: NEGATIVE NG/ML
OXYMORPHONE UR CFM-MCNC: NEGATIVE NG/ML
PCP UR QL: NEGATIVE NG/ML
PH UR: 5.8 [PH] (ref 4.5–9)
QUEST 6 ACETYLMORPHINE: ABNORMAL
QUEST NOTES AND COMMENTS: ABNORMAL
QUEST PATIENT HISTORICAL REPORT: ABNORMAL
QUEST ZOLPIDEM: NEGATIVE NG/ML
TEMAZEPAM UR-MCNC: NEGATIVE NG/ML
THC UR QL: NEGATIVE NG/ML
TRAMADOL UR-MCNC: NEGATIVE NG/ML
ZOLPIDEM PHENYL-4-CARB UR CFM-MCNC: NEGATIVE NG/ML

## 2025-06-03 ENCOUNTER — TELEPHONE (OUTPATIENT)
Dept: PRIMARY CARE | Facility: CLINIC | Age: 70
End: 2025-06-03
Payer: MEDICARE

## 2025-06-03 DIAGNOSIS — F41.1 GENERALIZED ANXIETY DISORDER: ICD-10-CM

## 2025-06-03 LAB
1OH-MIDAZOLAM UR-MCNC: NEGATIVE NG/ML
7AMINOCLONAZEPAM UR-MCNC: NEGATIVE NG/ML
A-OH ALPRAZ UR-MCNC: 235 NG/ML
A-OH-TRIAZOLAM UR-MCNC: NEGATIVE NG/ML
AMPHETAMINES UR QL: NEGATIVE NG/ML
BARBITURATES UR QL: NEGATIVE NG/ML
BZE UR QL: NEGATIVE NG/ML
CODEINE UR-MCNC: NEGATIVE NG/ML
CREAT UR-MCNC: 82.6 MG/DL
DRUG SCREEN COMMENT UR-IMP: ABNORMAL
EDDP UR-MCNC: NEGATIVE NG/ML
FENTANYL UR-MCNC: NEGATIVE NG/ML
HYDROCODONE UR-MCNC: NEGATIVE NG/ML
HYDROMORPHONE UR-MCNC: NEGATIVE NG/ML
LORAZEPAM UR-MCNC: NEGATIVE NG/ML
METHADONE UR-MCNC: NEGATIVE NG/ML
MORPHINE UR-MCNC: NEGATIVE NG/ML
NORDIAZEPAM UR-MCNC: NEGATIVE NG/ML
NORFENTANYL UR-MCNC: NEGATIVE NG/ML
NORHYDROCODONE UR CFM-MCNC: NEGATIVE NG/ML
NOROXYCODONE UR CFM-MCNC: NEGATIVE NG/ML
NORTRAMADOL UR-MCNC: NEGATIVE NG/ML
OH-ETHYLFLURAZ UR-MCNC: NEGATIVE NG/ML
OXAZEPAM UR-MCNC: NEGATIVE NG/ML
OXIDANTS UR QL: NEGATIVE MCG/ML
OXYCODONE UR CFM-MCNC: NEGATIVE NG/ML
OXYMORPHONE UR CFM-MCNC: NEGATIVE NG/ML
PCP UR QL: NEGATIVE NG/ML
PH UR: 5.8 [PH] (ref 4.5–9)
QUEST 6 ACETYLMORPHINE: NEGATIVE NG/ML
QUEST NOTES AND COMMENTS: ABNORMAL
QUEST ZOLPIDEM: NEGATIVE NG/ML
TEMAZEPAM UR-MCNC: NEGATIVE NG/ML
THC UR QL: NEGATIVE NG/ML
TRAMADOL UR-MCNC: NEGATIVE NG/ML
ZOLPIDEM PHENYL-4-CARB UR CFM-MCNC: NEGATIVE NG/ML

## 2025-06-03 RX ORDER — ALPRAZOLAM 0.5 MG/1
TABLET ORAL
Qty: 40 TABLET | Refills: 0 | Status: SHIPPED | OUTPATIENT
Start: 2025-06-03

## 2025-07-07 DIAGNOSIS — F41.1 GENERALIZED ANXIETY DISORDER: ICD-10-CM

## 2025-07-07 RX ORDER — ALPRAZOLAM 0.5 MG/1
TABLET ORAL
Qty: 40 TABLET | Refills: 0 | Status: SHIPPED | OUTPATIENT
Start: 2025-07-10

## 2025-07-07 RX ORDER — ALPRAZOLAM 0.5 MG/1
TABLET ORAL
Qty: 40 TABLET | Refills: 0 | OUTPATIENT
Start: 2025-07-07

## 2025-07-07 NOTE — TELEPHONE ENCOUNTER
Patient states usually 1 tablet a day or 1/2 tablet a day. States last script has no refill. Does need to go to [facility] pharmacy-doesn't need to be a 90 day supply at one time but did not have refills.

## 2025-07-17 ENCOUNTER — TELEPHONE (OUTPATIENT)
Dept: PRIMARY CARE | Facility: CLINIC | Age: 70
End: 2025-07-17
Payer: MEDICARE

## 2025-07-17 NOTE — TELEPHONE ENCOUNTER
Patient is wondering if you are able to put refills on her Alprazolam instead of her calling the office every month for a refill.

## 2025-07-22 DIAGNOSIS — E03.9 ACQUIRED HYPOTHYROIDISM: ICD-10-CM

## 2025-07-22 DIAGNOSIS — F41.1 GENERALIZED ANXIETY DISORDER: ICD-10-CM

## 2025-07-22 DIAGNOSIS — F41.9 ANXIETY: ICD-10-CM

## 2025-07-22 DIAGNOSIS — I10 PRIMARY HYPERTENSION: ICD-10-CM

## 2025-07-22 RX ORDER — METOPROLOL TARTRATE 50 MG/1
TABLET ORAL
OUTPATIENT
Start: 2025-07-22

## 2025-07-22 RX ORDER — ALPRAZOLAM 0.5 MG/1
0.5 TABLET ORAL EVERY 8 HOURS PRN
Qty: 40 TABLET | OUTPATIENT
Start: 2025-07-22

## 2025-07-29 ENCOUNTER — TELEPHONE (OUTPATIENT)
Dept: PRIMARY CARE | Facility: CLINIC | Age: 70
End: 2025-07-29
Payer: MEDICARE

## 2025-07-31 NOTE — TELEPHONE ENCOUNTER
Informed pt, she stated she would need a refill on 8/10 sent through mail Order.   
Pt called stating she doesn't need a refill now but wants to know why she does have any future refills for alprazolam. Stated she would like for there to be future refills so she doesn't have to call every time she needs one.  
I will START or STAY ON the medications listed below when I get home from the hospital:  None
None

## 2025-08-01 DIAGNOSIS — F41.1 GENERALIZED ANXIETY DISORDER: ICD-10-CM

## 2025-08-01 RX ORDER — ALPRAZOLAM 0.5 MG/1
TABLET ORAL
Qty: 120 TABLET | Refills: 0 | Status: SHIPPED | OUTPATIENT
Start: 2025-08-08

## 2025-08-04 ENCOUNTER — TELEPHONE (OUTPATIENT)
Dept: PRIMARY CARE | Facility: CLINIC | Age: 70
End: 2025-08-04
Payer: MEDICARE

## 2025-08-04 DIAGNOSIS — E03.9 ACQUIRED HYPOTHYROIDISM: ICD-10-CM

## 2025-08-04 DIAGNOSIS — F41.1 GENERALIZED ANXIETY DISORDER: ICD-10-CM

## 2025-08-04 DIAGNOSIS — I10 PRIMARY HYPERTENSION: ICD-10-CM

## 2025-08-04 DIAGNOSIS — F41.9 ANXIETY: ICD-10-CM

## 2025-08-04 RX ORDER — METOPROLOL TARTRATE 50 MG/1
50 TABLET ORAL 2 TIMES DAILY
Qty: 180 TABLET | Refills: 3 | Status: SHIPPED | OUTPATIENT
Start: 2025-08-04 | End: 2026-08-04

## 2025-08-04 NOTE — TELEPHONE ENCOUNTER
Pt is calling again about refills on Alprazolam  she wants to know why she can't get refills. Already informed her per previous message.  Can you call pt and let her know last test results and why she can't get future refills.

## 2025-08-29 ENCOUNTER — APPOINTMENT (OUTPATIENT)
Dept: GASTROENTEROLOGY | Facility: CLINIC | Age: 70
End: 2025-08-29
Payer: MEDICARE

## 2025-10-03 ENCOUNTER — APPOINTMENT (OUTPATIENT)
Dept: GASTROENTEROLOGY | Facility: CLINIC | Age: 70
End: 2025-10-03
Payer: MEDICARE

## 2025-12-15 ENCOUNTER — APPOINTMENT (OUTPATIENT)
Dept: PRIMARY CARE | Facility: CLINIC | Age: 70
End: 2025-12-15
Payer: MEDICARE